# Patient Record
Sex: FEMALE | Race: WHITE | NOT HISPANIC OR LATINO | Employment: UNEMPLOYED | URBAN - METROPOLITAN AREA
[De-identification: names, ages, dates, MRNs, and addresses within clinical notes are randomized per-mention and may not be internally consistent; named-entity substitution may affect disease eponyms.]

---

## 2017-03-15 ENCOUNTER — HOSPITAL ENCOUNTER (EMERGENCY)
Facility: MEDICAL CENTER | Age: 27
End: 2017-03-15
Attending: EMERGENCY MEDICINE
Payer: MEDICAID

## 2017-03-15 VITALS
TEMPERATURE: 98.2 F | BODY MASS INDEX: 27.47 KG/M2 | RESPIRATION RATE: 15 BRPM | HEART RATE: 49 BPM | SYSTOLIC BLOOD PRESSURE: 119 MMHG | OXYGEN SATURATION: 100 % | DIASTOLIC BLOOD PRESSURE: 70 MMHG | WEIGHT: 145.5 LBS | HEIGHT: 61 IN

## 2017-03-15 DIAGNOSIS — R10.9 CHRONIC ABDOMINAL PAIN: ICD-10-CM

## 2017-03-15 DIAGNOSIS — N39.0 ACUTE UTI: ICD-10-CM

## 2017-03-15 DIAGNOSIS — G89.29 CHRONIC ABDOMINAL PAIN: ICD-10-CM

## 2017-03-15 LAB
APPEARANCE UR: CLEAR
APPEARANCE UR: CLEAR
COLOR UR AUTO: YELLOW
COLOR UR AUTO: YELLOW
GLUCOSE UR QL STRIP.AUTO: NEGATIVE MG/DL
GLUCOSE UR QL STRIP.AUTO: NEGATIVE MG/DL
HCG UR QL: NEGATIVE
KETONES UR QL STRIP.AUTO: NEGATIVE MG/DL
KETONES UR QL STRIP.AUTO: NEGATIVE MG/DL
LEUKOCYTE ESTERASE UR QL STRIP.AUTO: NEGATIVE
LEUKOCYTE ESTERASE UR QL STRIP.AUTO: NEGATIVE
NITRITE UR QL STRIP.AUTO: POSITIVE
NITRITE UR QL STRIP.AUTO: POSITIVE
PH UR STRIP.AUTO: 6.5 [PH]
PH UR STRIP.AUTO: 6.5 [PH]
PROT UR QL STRIP: NEGATIVE MG/DL
PROT UR QL STRIP: NEGATIVE MG/DL
RBC UR QL AUTO: ABNORMAL
RBC UR QL AUTO: ABNORMAL
SP GR UR: 1.02
SP GR UR: 1.02

## 2017-03-15 PROCEDURE — 81025 URINE PREGNANCY TEST: CPT

## 2017-03-15 PROCEDURE — 81002 URINALYSIS NONAUTO W/O SCOPE: CPT | Mod: 91

## 2017-03-15 PROCEDURE — 99284 EMERGENCY DEPT VISIT MOD MDM: CPT

## 2017-03-15 RX ORDER — OXYCODONE HYDROCHLORIDE AND ACETAMINOPHEN 5; 325 MG/1; MG/1
1 TABLET ORAL EVERY 6 HOURS PRN
Qty: 15 TAB | Refills: 0 | Status: SHIPPED | OUTPATIENT
Start: 2017-03-15 | End: 2018-07-03

## 2017-03-15 RX ORDER — CIPROFLOXACIN 500 MG/1
500 TABLET, FILM COATED ORAL 2 TIMES DAILY
Qty: 14 TAB | Refills: 0 | Status: SHIPPED | OUTPATIENT
Start: 2017-03-15 | End: 2017-03-22

## 2017-03-15 NOTE — ED AVS SNAPSHOT
AddressHealth Access Code: [email or web address]  Expires: 4/14/2017  6:24 PM    Your email address is not on file at [facility].  Email Addresses are required for you to sign up for AddressHealth, please contact [phone] to verify your personal information and to provide your email address prior to attempting to register for AddressHealth.    Toña Turner  [address] [place] [address]    AddressHealth  A secure, online tool to manage your health information     [facility]’s AddressHealth® is a secure, online tool that connects you to your personalized health information from the privacy of your home -- day or night - making it very easy for you to manage your healthcare. Once the activation process is completed, you can even access your medical information using the AddressHealth regine, which is available for free in the Apple Regine store or Google Play store.     To learn more about AddressHealth, visit [email or web address]    There are two levels of access available (as shown below):   My Chart Features  [facility] Primary Care Doctor [facility]  Specialists [facility]  Urgent  Care [facility] Primary Care Doctor   Email your healthcare team securely and privately 24/7 X X X    Manage appointments: schedule your next appointment; view details of past/upcoming appointments X      Request prescription refills. X      View recent personal medical records, including lab and immunizations X X X X   View health record, including health history, allergies, medications X X X X   Read reports about your outpatient visits, procedures, consult and ER notes X X X X   See your discharge summary, which is a recap of your hospital and/or ER visit that includes your diagnosis, lab results, and care plan X X  X     How to register for [email or web address]:  Once your e-mail address has been verified, follow the following steps to sign up for AddressHealth.     1. Go to  [email or web address]  2. Click on the Sign Up Now box, which takes you to the New Member Sign Up page. You will  need to provide the following information:  a. Enter your M-Audio Access Code exactly as it appears at the top of this page. (You will not need to use this code after you’ve completed the sign-up process. If you do not sign up before the expiration date, you must request a new code.)   b. Enter your date of birth.   c. Enter your home email address.   d. Click Submit, and follow the next screen’s instructions.  3. Create a Companion Caninet ID. This will be your M-Audio login ID and cannot be changed, so think of one that is secure and easy to remember.  4. Create a M-Audio password. You can change your password at any time.  5. Enter your Password Reset Question and Answer. This can be used at a later time if you forget your password.   6. Enter your e-mail address. This allows you to receive e-mail notifications when new information is available in M-Audio.  7. Click Sign Up. You can now view your health information.    For assistance activating your M-Audio account, call [phone]

## 2017-03-15 NOTE — ED PROVIDER NOTES
ED Provider Note    HPI: Patient is a 26-year-old female who presented to the emergency department March 15, 2017 at 2:15 PM with a chief complaint of intermittent vaginal pain.    Patient's been seen multiple emergency departments for this. Symptoms began several months ago. The patient has an IUD in place. The patient states previous testing has been done including ultrasounds and laboratory studies. She has been diagnosed with urinary tract infection but no other findings of an made. The patient had an episode earlier today. The pain is identical to that experienced in the past. She had no vaginal bleeding or discharge. The patient has no point with a gynecologist on Friday. Pain is described as a stabbing pain. It is not positional in nature. No fever no chills no cough no nausea no vomiting. Nothing in particular seems to make it better or worse.    Review of Systems: Positive positive intermittent stabbing vaginal pain negative for fever chills cough nausea vomiting.     Past medical/surgical history: Chronic vaginal pain for about history of seizure    Medications: None    Allergies: Penicillin and Vicodin    Social History: Previous history smoking or alcohol use      Physical exam: Constitutional: Pleasant female awake and alert  Vital signs: Temperature 90.2 pulse 65 respirations 18 blood pressure 114/66 pulse oximetry 100%  Cardiac: Regular rate and rhythm. S1-S2 present. No S3 or S4 present. No murmurs, rubs, or gallops heard. No edema or varicosities were seen.   Lungs: Clear to auscultation with good aeration throughout. No wheezes, rales, or rhonchi heard. Patient's chest wall moved symmetrically with each respiratory effort. Patient was not making use of accessory muscles of respiration in breathing.  Abdomen: Soft nontender to palpation. Slightly obese but not markedly so. No rebound or guarding elicited. No organomegaly identified. No pulsatile abdominal masses identified.   Neurologic: alert and  awake answers questions appropriately. Moves all four extremities independently, no gross focal abnormalities identified. Normal strength and motor.  Skin: no rash or lesion seen, no palpable dermatologic lesions identified.  Psychiatric: not anxious, delusional, or hallucinating.    Medical decision making:  Urine dip obtained; pregnancy test negative positive for nitrites    I reviewed the patient's drug database report there is no record of the patient and the drug database.  the patient has previously received prescription for controlled substances from this facility so I do not know why she does not appear in the database. She does not appear to have a record of significant disuse of the emergency department however. I did ask the patient about this, and she states she is not signed in under any other names or addresses.    Given the patient's problems are chronic and she has appropriate follow-up scheduled in 48 hours, I did not think emergent imaging or other laboratory evaluation was indicated.    Patient given copies of her lab results. Patient discharged on small amount of pain medication and ciprofloxacin. Patient given the usual discharge instructions for abdominal pain. The patient appears to have a chronic problem at this time. She has follow-up scheduled in 48 hours. The patient is carefully counseled return to ED for increasing pain fever vomiting or any other problems    Patient verbalized understanding of these instructions and states she will comply    Impression 1) chronic abdominal pain  2) UTI

## 2017-03-15 NOTE — ED NOTES
C/O VAGINAL PAIN THAT RADIATES TO BELLY BUTTON, HAS BEEN SEEN AT NO [place], [facility] AND HERE 8X IN 2 MONTHS W NO DX , HAS APPT W [facility] ON Friday , DENIES DISCHARGE, FEVERS, SLIGHT PAIN W URINATION, GIVEN UA SUPPLIES

## 2017-03-15 NOTE — ED AVS SNAPSHOT
3/15/2017          Toña Turner  [address] [facility] [address]    Dear Toña:    [facility] wants to ensure your discharge home is safe and you or your loved ones have had all your questions answered regarding your care after you leave the hospital.    You may receive a telephone call within two days of your discharge.  This call is to make certain you understand your discharge instructions as well as ensure we provided you with the best care possible during your stay with us.     The call will only last approximately 3-5 minutes and will be done by a nurse.    Once again, we want to ensure your discharge home is safe and that you have a clear understanding of any next steps in your care.  If you have any questions or concerns, please do not hesitate to contact us, we are here for you.  Thank you for choosing [facility] for your healthcare needs.    Sincerely,    Glenn Odom    [facility]

## 2017-03-15 NOTE — ED AVS SNAPSHOT
Home Care Instructions                                                                                                                Toña Turner   MRN: [id number]    Department:  [facility], Emergency Dept   Date of Visit:  3/15/2017            [facility], Emergency Dept    [address] [place] [id number]    Phone:  [phone]      You were seen by     Reji Ignacio M.D.      Your Diagnosis Was     Chronic abdominal pain     R10.9, G89.29       Follow-up Information     1. Follow up with [facility], Emergency Dept.    Specialty:  Emergency Medicine    Why:  As needed    Contact information    [address] [place] [id number] [phone]      Medication Information     Review all of your home medications and newly ordered medications with your primary doctor and/or pharmacist as soon as possible. Follow medication instructions as directed by your doctor and/or pharmacist.     Please keep your complete medication list with you and share with your physician. Update the information when medications are discontinued, doses are changed, or new medications (including over-the-counter products) are added; and carry medication information at all times in the event of emergency situations.               Medication List      START taking these medications        Instructions    Morning Afternoon Evening Bedtime    ciprofloxacin 500 MG Tabs   Commonly known as:  CIPRO        Take 1 Tab by mouth 2 times a day for 7 days.   Dose:  500 mg                          ASK your doctor about these medications        Instructions    Morning Afternoon Evening Bedtime    cyclobenzaprine 10 MG Tabs   Commonly known as:  FLEXERIL        Take 1 Tab by mouth 3 times a day as needed.   Dose:  10 mg                        * ibuprofen 600 MG Tabs   Commonly known as:  MOTRIN        Take 1 Tab by mouth every 6 hours as needed.   Dose:  600 mg                        * ibuprofen 200 MG Tabs   Commonly known as:  MOTRIN        Take 400 mg by mouth every 6 hours as needed.   Dose:  400 mg                        * oxycodone-acetaminophen 5-325 MG Tabs   What changed:  Another medication with the same name was added. Make sure you understand how and when to take each.   Commonly known as:  PERCOCET   Ask about: Which instructions should I use?        Take 1-2 Tabs by mouth every four hours as needed (pain).   Dose:  1-2 Tab                        * oxycodone-acetaminophen 5-325 MG Tabs   What changed:  Another medication with the same name was added. Make sure you understand how and when to take each.   Commonly known as:  PERCOCET   Ask about: Which instructions should I use?        Take 1-2 Tabs by mouth every four hours as needed.   Dose:  1-2 Tab                        * oxycodone-acetaminophen 5-325 MG Tabs   What changed:  You were already taking a medication with the same name, and this prescription was added. Make sure you understand how and when to take each.   Commonly known as:  PERCOCET   Ask about: Which instructions should I use?        Take 1 Tab by mouth every 6 hours as needed.   Dose:  1 Tab                        * Notice:  This list has 5 medication(s) that are the same as other medications prescribed for you. Read the directions carefully, and ask your doctor or other care provider to review them with you.         Where to Get Your Medications      You can get these medications from any pharmacy     Bring a paper prescription for each of these medications    - ciprofloxacin 500 MG Tabs  - oxycodone-acetaminophen 5-325 MG Tabs            Procedures and tests performed during your visit     Procedure/Test Number of Times Performed    POC UA 2    POC URINE PREGNANCY 1        Discharge Instructions       Abdominal Pain (Nonspecific)  Your exam might not show the exact reason you have abdominal pain. Since there are many different causes of abdominal pain, another  checkup and more tests may be needed. It is very important to follow up for lasting (persistent) or worsening symptoms. A possible cause of abdominal pain in any person who still has his or her appendix is acute appendicitis. Appendicitis is often hard to diagnose. Normal blood tests, urine tests, ultrasound, and CT scans do not completely rule out early appendicitis or other causes of abdominal pain. Sometimes, only the changes that happen over time will allow appendicitis and other causes of abdominal pain to be determined. Other potential problems that may require surgery may also take time to become more apparent. Because of this, it is important that you follow all of the instructions below.  HOME CARE INSTRUCTIONS   · Rest as much as possible.   · Do not eat solid food until your pain is gone.   · While adults or children have pain: A diet of water, weak decaffeinated tea, broth or bouillon, gelatin, oral rehydration solutions (ORS), frozen ice pops, or ice chips may be helpful.   · When pain is gone in adults or children: Start a light diet (dry toast, crackers, applesauce, or white rice). Increase the diet slowly as long as it does not bother you. Eat no dairy products (including cheese and eggs) and no spicy, fatty, fried, or high-fiber foods.   · Use no alcohol, caffeine, or cigarettes.   · Take your regular medicines unless your caregiver told you not to.   · Take any prescribed medicine as directed.   · Only take over-the-counter or prescription medicines for pain, discomfort, or fever as directed by your caregiver. Do not give aspirin to children.   If your caregiver has given you a follow-up appointment, it is very important to keep that appointment. Not keeping the appointment could result in a permanent injury and/or lasting (chronic) pain and/or disability. If there is any problem keeping the appointment, you must call to reschedule.   SEEK IMMEDIATE MEDICAL CARE IF:   · Your pain is not gone in 24  hours.   · Your pain becomes worse, changes location, or feels different.   · You or your child has an oral temperature above 102° F (38.9° C), not controlled by medicine.   · Your baby is older than 3 months with a rectal temperature of 102° F (38.9° C) or higher.   · Your baby is 3 months old or younger with a rectal temperature of 100.4° F (38° C) or higher.   · You have shaking chills.   · You keep throwing up (vomiting) or cannot drink liquids.   · There is blood in your vomit or you see blood in your bowel movements.   · Your bowel movements become dark or black.   · You have frequent bowel movements.   · Your bowel movements stop (become blocked) or you cannot pass gas.   · You have bloody, frequent, or painful urination.   · You have yellow discoloration in the skin or whites of the eyes.   · Your stomach becomes bloated or bigger.   · You have dizziness or fainting.   · You have chest or back pain.   MAKE SURE YOU:   · Understand these instructions.   · Will watch your condition.   · Will get help right away if you are not doing well or get worse.   Document Released: 12/18/2006 Document Revised: 03/11/2013 Document Reviewed: 11/15/2010  ExitCare® Patient Information ©2013 [employer].          Patient Information     Patient Information    Following emergency treatment: all patient requiring follow-up care must return either to a private physician or a clinic if your condition worsens before you are able to obtain further medical attention, please return to the emergency room.     Billing Information    At [facility], we work to make the billing process streamlined for our patients.  Our Representatives are here to answer any questions you may have regarding your hospital bill.  If you have insurance coverage and have supplied your insurance information to us, we will submit a claim to your insurer on your behalf.  Should you have any questions regarding your bill, we can be reached online or by phone  as follows:  Online: You are able pay your bills online or live chat with our representatives about any billing questions you may have. We are here to help Monday - Friday from 8:00am to 7:30pm and 9:00am - 12:00pm on Saturdays.  Please visit [email or web address]  for more information.   Phone:  [phone] or [phone]    Please note that your emergency physician, surgeon, pathologist, radiologist, anesthesiologist, and other specialists are not employed by [facility] and will therefore bill separately for their services.  Please contact them directly for any questions concerning their bills at the numbers below:     Emergency Physician Services:  [phone]  [facility]:  [phone]  [facility]:  [phone]  [facility]:  [phone]    1. Your final bill may vary from the amount quoted upon discharge if all procedures are not complete at that time, or if your doctor has additional procedures of which we are not aware. You will receive an additional bill if you return to the Emergency Department at [facility] for suture removal regardless of the facility of which the sutures were placed.     2. Please arrange for settlement of this account at the emergency registration.    3. All self-pay accounts are due in full at the time of treatment.  If you are unable to meet this obligation then payment is expected within 4-5 days.     4. If you have had radiology studies (CT, X-ray, Ultrasound, MRI), you have received a preliminary result during your emergency department visit. Please contact the radiology department [phone] to receive a copy of your final result. Please discuss the Final result with your primary physician or with the follow up physician provided.     Crisis Hotline:  [place] Crisis Hotline:  9-094-IQCOTQB or 1-611.782.2928  [place] Crisis Hotline:    1-296.264.2849 or 465-474-5989         ED Discharge  Follow Up Questions    1. In order to provide you with very good care, we would like to follow up with a phone call in the next few days.  May we have your permission to contact you?     YES /  NO    2. What is the best phone number to call you? (       )_____-__________    3. What is the best time to call you?      Morning  /  Afternoon  /  Evening                   Patient Signature:  ____________________________________________________________    Date:  ____________________________________________________________

## 2017-03-16 NOTE — DISCHARGE INSTRUCTIONS
Abdominal Pain (Nonspecific)  Your exam might not show the exact reason you have abdominal pain. Since there are many different causes of abdominal pain, another checkup and more tests may be needed. It is very important to follow up for lasting (persistent) or worsening symptoms. A possible cause of abdominal pain in any person who still has his or her appendix is acute appendicitis. Appendicitis is often hard to diagnose. Normal blood tests, urine tests, ultrasound, and CT scans do not completely rule out early appendicitis or other causes of abdominal pain. Sometimes, only the changes that happen over time will allow appendicitis and other causes of abdominal pain to be determined. Other potential problems that may require surgery may also take time to become more apparent. Because of this, it is important that you follow all of the instructions below.  HOME CARE INSTRUCTIONS   · Rest as much as possible.   · Do not eat solid food until your pain is gone.   · While adults or children have pain: A diet of water, weak decaffeinated tea, broth or bouillon, gelatin, oral rehydration solutions (ORS), frozen ice pops, or ice chips may be helpful.   · When pain is gone in adults or children: Start a light diet (dry toast, crackers, applesauce, or white rice). Increase the diet slowly as long as it does not bother you. Eat no dairy products (including cheese and eggs) and no spicy, fatty, fried, or high-fiber foods.   · Use no alcohol, caffeine, or cigarettes.   · Take your regular medicines unless your caregiver told you not to.   · Take any prescribed medicine as directed.   · Only take over-the-counter or prescription medicines for pain, discomfort, or fever as directed by your caregiver. Do not give aspirin to children.   If your caregiver has given you a follow-up appointment, it is very important to keep that appointment. Not keeping the appointment could result in a permanent injury and/or lasting (chronic) pain  and/or disability. If there is any problem keeping the appointment, you must call to reschedule.   SEEK IMMEDIATE MEDICAL CARE IF:   · Your pain is not gone in 24 hours.   · Your pain becomes worse, changes location, or feels different.   · You or your child has an oral temperature above 102° F (38.9° C), not controlled by medicine.   · Your baby is older than 3 months with a rectal temperature of 102° F (38.9° C) or higher.   · Your baby is 3 months old or younger with a rectal temperature of 100.4° F (38° C) or higher.   · You have shaking chills.   · You keep throwing up (vomiting) or cannot drink liquids.   · There is blood in your vomit or you see blood in your bowel movements.   · Your bowel movements become dark or black.   · You have frequent bowel movements.   · Your bowel movements stop (become blocked) or you cannot pass gas.   · You have bloody, frequent, or painful urination.   · You have yellow discoloration in the skin or whites of the eyes.   · Your stomach becomes bloated or bigger.   · You have dizziness or fainting.   · You have chest or back pain.   MAKE SURE YOU:   · Understand these instructions.   · Will watch your condition.   · Will get help right away if you are not doing well or get worse.   Document Released: 12/18/2006 Document Revised: 03/11/2013 Document Reviewed: 11/15/2010  ExitCare® Patient Information ©2013 [employer].

## 2017-03-16 NOTE — ED NOTES
All lines and monitors d/c'd. Discharge paperwork and medications reviewed. Pt states she understands and has no questions. Pt encouraged to follow-up with PCP and come back to ER with worsening symptoms. Instructed not to drive after taking pain medication. Pt verbalizes understanding. Pt ambulated out of ED with steady gait.

## 2017-07-22 ENCOUNTER — HOSPITAL ENCOUNTER (EMERGENCY)
Facility: MEDICAL CENTER | Age: 27
End: 2017-07-22
Attending: EMERGENCY MEDICINE
Payer: MEDICAID

## 2017-07-22 VITALS
OXYGEN SATURATION: 97 % | RESPIRATION RATE: 14 BRPM | DIASTOLIC BLOOD PRESSURE: 72 MMHG | SYSTOLIC BLOOD PRESSURE: 136 MMHG | TEMPERATURE: 98.2 F | BODY MASS INDEX: 26.63 KG/M2 | HEART RATE: 76 BPM | WEIGHT: 140.87 LBS

## 2017-07-22 DIAGNOSIS — Z20.2 STD EXPOSURE: ICD-10-CM

## 2017-07-22 PROCEDURE — 99283 EMERGENCY DEPT VISIT LOW MDM: CPT

## 2017-07-22 NOTE — DISCHARGE INSTRUCTIONS
Sexually Transmitted Disease  A sexually transmitted disease (STD) is a disease or infection often passed to another person during sex. However, STDs can be passed through nonsexual ways. An STD can be passed through:  · Spit (saliva).  · Semen.  · Blood.  · Mucus from the vagina.  · Pee (urine).  HOW CAN I LESSEN MY CHANCES OF GETTING AN STD?  · Use:  ¨ Latex condoms.  ¨ Water-soluble lubricants with condoms. Do not use petroleum jelly or oils.  ¨ Dental dams. These are small pieces of latex that are used as a barrier during oral sex.  · Avoid having more than one sex partner.  · Do not have sex with someone who has other sex partners.  · Do not have sex with anyone you do not know or who is at high risk for an STD.  · Avoid risky sex that can break your skin.  · Do not have sex if you have open sores on your mouth or skin.  · Avoid drinking too much alcohol or taking illegal drugs. Alcohol and drugs can affect your good judgment.  · Avoid oral and anal sex acts.  · Get shots (vaccines) for HPV and hepatitis.  · If you are at risk of being infected with HIV, it is advised that you take a certain medicine daily to prevent HIV infection. This is called pre-exposure prophylaxis (PrEP). You may be at risk if:  ¨ You are a man who has sex with other men (MSM).  ¨ You are attracted to the opposite sex (heterosexual) and are having sex with more than one partner.  ¨ You take drugs with a needle.  ¨ You have sex with someone who has HIV.  · Talk with your doctor about if you are at high risk of being infected with HIV. If you begin to take PrEP, get tested for HIV first. Get tested every 3 months for as long as you are taking PrEP.  · Get tested for STDs every year if you are sexually active. If you are treated for an STD, get tested again 3 months after you are treated.  WHAT SHOULD I DO IF I THINK I HAVE AN STD?  · See your doctor.  · Tell your sex partner(s) that you have an STD. They should be tested and treated.  · Do  not have sex until your doctor says it is okay.  WHEN SHOULD I GET HELP?  Get help right away if:  · You have bad belly (abdominal) pain.  · You are a man and have puffiness (swelling) or pain in your testicles.  · You are a woman and have puffiness in your vagina.     This information is not intended to replace advice given to you by your health care provider. Make sure you discuss any questions you have with your health care provider.     Document Released: 01/25/2006 Document Revised: 01/08/2016 Document Reviewed: 06/13/2014  [employer] Interactive Patient Education ©2016 [employer]

## 2017-07-22 NOTE — ED AVS SNAPSHOT
Kewego Access Code: [email or web address]  Expires: 8/21/2017  1:41 AM    Your email address is not on file at [facility].  Email Addresses are required for you to sign up for Kewego, please contact [phone] to verify your personal information and to provide your email address prior to attempting to register for Kewego.    Toña Turner  [address] [facility] [place] [address]    Kewego  A secure, online tool to manage your health information     [facility]’s Kewego® is a secure, online tool that connects you to your personalized health information from the privacy of your home -- day or night - making it very easy for you to manage your healthcare. Once the activation process is completed, you can even access your medical information using the Kewego regine, which is available for free in the Apple Regine store or Google Play store.     To learn more about Kewego, visit [email or web address] [email or web address]    There are two levels of access available (as shown below):   My Chart Features  [facility] Primary Care Doctor [facility]  Specialists [facility]  Urgent  Care Non-[facility] Primary Care Doctor   Email your healthcare team securely and privately 24/7 X X X    Manage appointments: schedule your next appointment; view details of past/upcoming appointments X      Request prescription refills. X      View recent personal medical records, including lab and immunizations X X X X   View health record, including health history, allergies, medications X X X X   Read reports about your outpatient visits, procedures, consult and ER notes X X X X   See your discharge summary, which is a recap of your hospital and/or ER visit that includes your diagnosis, lab results, and care plan X X  X     How to register for [employer]:  Once your e-mail address has been verified, follow the following steps to sign up for [employer].     1. Go to  [email or web address]  2. Click on the Sign Up Now box, which takes you to the New Member Sign Up page. You will  need to provide the following information:  a. Enter your [employer] Access Code exactly as it appears at the top of this page. (You will not need to use this code after you’ve completed the sign-up process. If you do not sign up before the expiration date, you must request a new code.)   b. Enter your date of birth.   c. Enter your home email address.   d. Click Submit, and follow the next screen’s instructions.  3. Create a Intuitt ID. This will be your [employer] login ID and cannot be changed, so think of one that is secure and easy to remember.  4. Create a [employer] password. You can change your password at any time.  5. Enter your Password Reset Question and Answer. This can be used at a later time if you forget your password.   6. Enter your e-mail address. This allows you to receive e-mail notifications when new information is available in [employer].  7. Click Sign Up. You can now view your health information.    For assistance activating your [employer] account, call [phone]

## 2017-07-22 NOTE — ED AVS SNAPSHOT
Home Care Instructions                                                                                                                Toña Turner   MRN: [id number]    Department:  [facility], Emergency Dept   Date of Visit:  7/22/2017            [facility], Emergency Dept    [address] [address]    Phone:  [phone]      You were seen by     Kennedy Duncan M.D.      Your Diagnosis Was     STD exposure     Z20.2       Follow-up Information     1. Schedule an appointment as soon as possible for a visit with [facility].    Contact information    [address] [address] [phone]        2. Follow up with [facility], Emergency Dept.    Specialty:  Emergency Medicine    Why:  If symptoms worsen    Contact information    [address] [address] [phone]      Medication Information     Review all of your home medications and newly ordered medications with your primary doctor and/or pharmacist as soon as possible. Follow medication instructions as directed by your doctor and/or pharmacist.     Please keep your complete medication list with you and share with your physician. Update the information when medications are discontinued, doses are changed, or new medications (including over-the-counter products) are added; and carry medication information at all times in the event of emergency situations.               Medication List      ASK your doctor about these medications        Instructions    Morning Afternoon Evening Bedtime    cyclobenzaprine 10 MG Tabs   Commonly known as:  FLEXERIL        Take 1 Tab by mouth 3 times a day as needed.   Dose:  10 mg                        * ibuprofen 600 MG Tabs   Commonly known as:  MOTRIN        Take 1 Tab by mouth every 6 hours as needed.   Dose:  600 mg                        * ibuprofen 200 MG Tabs   Commonly known as:  MOTRIN        Take 400 mg  by mouth every 6 hours as needed.   Dose:  400 mg                        * oxycodone-acetaminophen 5-325 MG Tabs   Commonly known as:  PERCOCET        Take 1-2 Tabs by mouth every four hours as needed (pain).   Dose:  1-2 Tab                        * oxycodone-acetaminophen 5-325 MG Tabs   Commonly known as:  PERCOCET        Take 1-2 Tabs by mouth every four hours as needed.   Dose:  1-2 Tab                        * oxycodone-acetaminophen 5-325 MG Tabs   Commonly known as:  PERCOCET        Take 1 Tab by mouth every 6 hours as needed.   Dose:  1 Tab                        * Notice:  This list has 5 medication(s) that are the same as other medications prescribed for you. Read the directions carefully, and ask your doctor or other care provider to review them with you.              Discharge Instructions       Sexually Transmitted Disease  A sexually transmitted disease (STD) is a disease or infection often passed to another person during sex. However, STDs can be passed through nonsexual ways. An STD can be passed through:  · Spit (saliva).  · Semen.  · Blood.  · Mucus from the vagina.  · Pee (urine).  HOW CAN I LESSEN MY CHANCES OF GETTING AN STD?  · Use:  ¨ Latex condoms.  ¨ Water-soluble lubricants with condoms. Do not use petroleum jelly or oils.  ¨ Dental dams. These are small pieces of latex that are used as a barrier during oral sex.  · Avoid having more than one sex partner.  · Do not have sex with someone who has other sex partners.  · Do not have sex with anyone you do not know or who is at high risk for an STD.  · Avoid risky sex that can break your skin.  · Do not have sex if you have open sores on your mouth or skin.  · Avoid drinking too much alcohol or taking illegal drugs. Alcohol and drugs can affect your good judgment.  · Avoid oral and anal sex acts.  · Get shots (vaccines) for HPV and hepatitis.  · If you are at risk of being infected with HIV, it is advised that you take a certain medicine daily  to prevent HIV infection. This is called pre-exposure prophylaxis (PrEP). You may be at risk if:  ¨ You are a man who has sex with other men (MSM).  ¨ You are attracted to the opposite sex (heterosexual) and are having sex with more than one partner.  ¨ You take drugs with a needle.  ¨ You have sex with someone who has HIV.  · Talk with your doctor about if you are at high risk of being infected with HIV. If you begin to take PrEP, get tested for HIV first. Get tested every 3 months for as long as you are taking PrEP.  · Get tested for STDs every year if you are sexually active. If you are treated for an STD, get tested again 3 months after you are treated.  WHAT SHOULD I DO IF I THINK I HAVE AN STD?  · See your doctor.  · Tell your sex partner(s) that you have an STD. They should be tested and treated.  · Do not have sex until your doctor says it is okay.  WHEN SHOULD I GET HELP?  Get help right away if:  · You have bad belly (abdominal) pain.  · You are a man and have puffiness (swelling) or pain in your testicles.  · You are a woman and have puffiness in your vagina.     This information is not intended to replace advice given to you by your health care provider. Make sure you discuss any questions you have with your health care provider.     Document Released: 01/25/2006 Document Revised: 01/08/2016 Document Reviewed: 06/13/2014  [employer] Interactive Patient Education ©2016 [employer]            Patient Information     Patient Information    Following emergency treatment: all patient requiring follow-up care must return either to a private physician or a clinic if your condition worsens before you are able to obtain further medical attention, please return to the emergency room.     Billing Information    At [facility], we work to make the billing process streamlined for our patients.  Our Representatives are here to answer any questions you may have regarding your hospital bill.  If you have insurance  coverage and have supplied your insurance information to us, we will submit a claim to your insurer on your behalf.  Should you have any questions regarding your bill, we can be reached online or by phone as follows:  Online: You are able pay your bills online or live chat with our representatives about any billing questions you may have. We are here to help Monday - Friday from 8:00am to 7:30pm and 9:00am - 12:00pm on Saturdays.  Please visit [email or web address]  for more information.   Phone:  [phone] or [phone]    Please note that your emergency physician, surgeon, pathologist, radiologist, anesthesiologist, and other specialists are not employed by [facility] and will therefore bill separately for their services.  Please contact them directly for any questions concerning their bills at the numbers below:     Emergency Physician Services:  [phone]  [facility]:  [phone]  [facility]:  [phone]  [facility]:  [phone]    1. Your final bill may vary from the amount quoted upon discharge if all procedures are not complete at that time, or if your doctor has additional procedures of which we are not aware. You will receive an additional bill if you return to the Emergency Department at [facility] for suture removal regardless of the facility of which the sutures were placed.     2. Please arrange for settlement of this account at the emergency registration.    3. All self-pay accounts are due in full at the time of treatment.  If you are unable to meet this obligation then payment is expected within 4-5 days.     4. If you have had radiology studies (CT, X-ray, Ultrasound, MRI), you have received a preliminary result during your emergency department visit. Please contact the radiology department [phone] to receive a copy of your final result. Please discuss the Final result with your primary  physician or with the follow up physician provided.     Crisis Hotline:  [place] Crisis Hotline:  4-753-XDQVFLP or 1-984.890.8347  [place] Crisis Hotline:    1-240.661.7222 or 300-992-8510         ED Discharge Follow Up Questions    1. In order to provide you with very good care, we would like to follow up with a phone call in the next few days.  May we have your permission to contact you?     YES /  NO    2. What is the best phone number to call you? (       )_____-__________    3. What is the best time to call you?      Morning  /  Afternoon  /  Evening                   Patient Signature:  ____________________________________________________________    Date:  ____________________________________________________________

## 2017-07-22 NOTE — ED AVS SNAPSHOT
7/22/2017    Toña Turner  [address] [facility] [address]    Dear Toña:    [facility] wants to ensure your discharge home is safe and you or your loved ones have had all of your questions answered regarding your care after you leave the hospital.    Below is a list of resources and contact information should you have any questions regarding your hospital stay, follow-up instructions, or active medical symptoms.    Questions or Concerns Regarding… Contact   Medical Questions Related to Your Discharge  (7 days a week, 8am-5pm) Contact a Nurse Care Coordinator   [phone]   Medical Questions Not Related to Your Discharge  (24 hours a day / 7 days a week)  Contact the Nurse Health Line   [phone]    Medications or Discharge Instructions Refer to your discharge packet   or contact your [facility] Primary Care Provider   [phone]   Follow-up Appointment(s) Schedule your appointment via [employer]   or contact Scheduling [phone]   Billing Review your statement via [employer]  or contact Billing [phone]   Medical Records Review your records via [employer]   or contact Medical Records [phone]     You may receive a telephone call within two days of discharge. This call is to make certain you understand your discharge instructions and have the opportunity to have any questions answered. You can also easily access your medical information, test results and upcoming appointments via the [employer] free online health management tool. You can learn more and sign up at [email or web address]. For assistance setting up your [employer] account, please call [phone].    Once again, we want to ensure your discharge home is safe and that you have a clear understanding of any next steps in your care. If you have any questions or concerns, please do not hesitate to contact us, we are here for you. Thank you for choosing [facility] for your healthcare needs.    Sincerely,    Your [facility] Healthcare Team

## 2017-07-22 NOTE — PROGRESS NOTES
Discharge orders received from ERP. No new prescriptions received. Provided education and patient demonstrated understanding. Pt ambulatory off unit. All personal belonging with patient.

## 2017-07-22 NOTE — ED NOTES
Chief Complaint   Patient presents with   • Exposure to STD     pt with ex boyfriend having genital herpes and wants to be tested for this denies any blisters to genital area     .Pt Informed regarding triage process and verbalized understanding to inform triage tech or RN for any changes in condition.  Placed in lobby.

## 2018-07-03 ENCOUNTER — HOSPITAL ENCOUNTER (EMERGENCY)
Facility: MEDICAL CENTER | Age: 28
End: 2018-07-03
Attending: EMERGENCY MEDICINE
Payer: MEDICAID

## 2018-07-03 ENCOUNTER — APPOINTMENT (OUTPATIENT)
Dept: RADIOLOGY | Facility: MEDICAL CENTER | Age: 28
End: 2018-07-03
Attending: EMERGENCY MEDICINE
Payer: MEDICAID

## 2018-07-03 VITALS
RESPIRATION RATE: 16 BRPM | HEART RATE: 60 BPM | OXYGEN SATURATION: 98 % | SYSTOLIC BLOOD PRESSURE: 108 MMHG | HEIGHT: 62 IN | DIASTOLIC BLOOD PRESSURE: 72 MMHG | BODY MASS INDEX: 25.88 KG/M2 | TEMPERATURE: 98 F | WEIGHT: 140.65 LBS

## 2018-07-03 DIAGNOSIS — S92.425A CLOSED NONDISPLACED FRACTURE OF DISTAL PHALANX OF LEFT GREAT TOE, INITIAL ENCOUNTER: ICD-10-CM

## 2018-07-03 PROCEDURE — 73660 X-RAY EXAM OF TOE(S): CPT | Mod: LT

## 2018-07-03 PROCEDURE — 99284 EMERGENCY DEPT VISIT MOD MDM: CPT

## 2018-07-03 ASSESSMENT — PAIN SCALES - GENERAL: PAINLEVEL_OUTOF10: 10

## 2018-07-04 NOTE — ED PROVIDER NOTES
"ED Provider Note    CHIEF COMPLAINT   Chief Complaint   Patient presents with   • Foot Pain     Pt states she dropped a bed frame on her left big toe.  Pt has +CMS in toe.  Toe is bruised and painful to touch.         HPI   Toña Turner is a 28 y.o. female who presents for moderately severe left great toe pain and bruising with swelling.  She dropped a piece of a bed she was disassembling onto the toe yesterday.  She can walk with difficulty.    REVIEW OF SYSTEMS   Pertinent positives: Left toe pain swelling and bruising.  Pertinent negatives: Deformity.    PAST MEDICAL HISTORY   Past Medical History:   Diagnosis Date   • Pregnancy    • Seizure (HCC)        PAST SURGICAL HISTORY  Past Surgical History:   Procedure Laterality Date   • OTHER      tonselectomy   • OTHER ABDOMINAL SURGERY      appendectomy       CURRENT MEDICATIONS   Home Medications     Reviewed by Palmira Carrera R.N. (Registered Nurse) on 07/03/18 at 1958  Med List Status: Complete   Medication Last Dose Status        Patient Kuldeep Taking any Medications                       ALLERGIES   Allergies   Allergen Reactions   • Pcn [Penicillins] Rash   • Vicodin [Hydrocodone-Acetaminophen] Rash       PHYSICAL EXAM  VITAL SIGNS: /77   Pulse 76   Temp 37.1 °C (98.7 °F) (Temporal)   Resp 18   Ht 1.575 m (5' 2\")   Wt 63.8 kg (140 lb 10.5 oz)   SpO2 98%   BMI 25.73 kg/m²   Constitutional: Well developed, Well nourished, borderline blood pressure elevation.  HENT: Atraumatic.   Cardiovascular:  Peripheral pulses dorsalis pedis 1+.  Skin: Ecchymotic great toe.   Musculoskeletal: Diffuse tenderness of left great toe with decreased range of motion due to pain but no deformity  Compartments: Soft foot  Neurologic: Toe flexion extension and sensation intact but movement limited by pain    RADIOLOGY/PROCEDURES  DX-TOE(S) 2+ LEFT   Final Result      Mildly comminuted first terminal tuft fracture          COURSE & MEDICAL DECISION MAKING  This patient " presents with a tuft fracture after blunt trauma to her great toe.  She will be treated with a postop shoe ibuprofen Tylenol and ice    PLAN:  As above    CONDITION: good    FINAL IMPRESSION  1. Closed nondisplaced fracture of distal phalanx of left great toe, initial encounter            Electronically signed by: Douglas Matos, 7/3/2018 8:08 PM

## 2018-07-04 NOTE — ED NOTES
Pt to lobby via wheelchair   Vital signs stable.   Pt states understanding of discharge instructions and paperwork.   Pt states they will follow up with PCP.   Pt handed prescriptions.   Pt states they have a safe way home.

## 2018-07-04 NOTE — ED TRIAGE NOTES
"Chief Complaint   Patient presents with   • Foot Pain     Pt states she dropped a bed frame on her left big toe.  Pt has +CMS in toe.  Toe is bruised and painful to touch.       Pt ambulatory with some difficulty to triage with above complaint.  Pt states she dropped the bed on her foot 24 hours ago.      Pt returned to [facility], educated on triage process, and to inform staff of any changes or concerns.    Blood pressure 137/77, pulse 76, temperature 37.1 °C (98.7 °F), temperature source Temporal, resp. rate 18, height 1.575 m (5' 2\"), weight 63.8 kg (140 lb 10.5 oz), SpO2 98 %.      "

## 2018-07-04 NOTE — ED NOTES
Pt wheeled to [facility], here for left big toe pain after a bedframe fell yesterday. Big toe bruised, tender to touch. Up for eval

## 2018-07-04 NOTE — DISCHARGE INSTRUCTIONS
Ice the toe for 15 minutes at a time for the next 24 hours with a barrier cloth between the ice in your skin.  Take ibuprofen and Tylenol for pain.  Wear postop shoe or other stiff shoes while helpful.  Follow-up for repeat x-rays if not improving in 1 week.

## 2019-10-27 ENCOUNTER — APPOINTMENT (OUTPATIENT)
Dept: RADIOLOGY | Facility: MEDICAL CENTER | Age: 29
End: 2019-10-27
Attending: PHYSICIAN ASSISTANT
Payer: MEDICAID

## 2019-10-27 ENCOUNTER — HOSPITAL ENCOUNTER (INPATIENT)
Facility: MEDICAL CENTER | Age: 29
LOS: 2 days | End: 2019-10-29
Attending: OBSTETRICS & GYNECOLOGY | Admitting: OBSTETRICS & GYNECOLOGY
Payer: MEDICAID

## 2019-10-27 DIAGNOSIS — R52 POSTPARTUM PAIN: ICD-10-CM

## 2019-10-27 LAB
ABO GROUP BLD: NORMAL
ALBUMIN SERPL BCP-MCNC: 3.3 G/DL (ref 3.2–4.9)
ALBUMIN/GLOB SERPL: 1.1 G/DL
ALP SERPL-CCNC: 120 U/L (ref 30–99)
ALT SERPL-CCNC: 9 U/L (ref 2–50)
AMPHET UR QL SCN: NEGATIVE
ANION GAP SERPL CALC-SCNC: 9 MMOL/L (ref 0–11.9)
AST SERPL-CCNC: 14 U/L (ref 12–45)
BARBITURATES UR QL SCN: NEGATIVE
BASOPHILS # BLD AUTO: 0.3 % (ref 0–1.8)
BASOPHILS # BLD: 0.02 K/UL (ref 0–0.12)
BENZODIAZ UR QL SCN: NEGATIVE
BILIRUB SERPL-MCNC: 0.2 MG/DL (ref 0.1–1.5)
BLD GP AB SCN SERPL QL: NORMAL
BUN SERPL-MCNC: 6 MG/DL (ref 8–22)
BZE UR QL SCN: NEGATIVE
CALCIUM SERPL-MCNC: 9.2 MG/DL (ref 8.5–10.5)
CANNABINOIDS UR QL SCN: POSITIVE
CHLORIDE SERPL-SCNC: 106 MMOL/L (ref 96–112)
CO2 SERPL-SCNC: 21 MMOL/L (ref 20–33)
CREAT SERPL-MCNC: 0.59 MG/DL (ref 0.5–1.4)
EOSINOPHIL # BLD AUTO: 0.1 K/UL (ref 0–0.51)
EOSINOPHIL NFR BLD: 1.3 % (ref 0–6.9)
ERYTHROCYTE [DISTWIDTH] IN BLOOD BY AUTOMATED COUNT: 44.9 FL (ref 35.9–50)
GLOBULIN SER CALC-MCNC: 3 G/DL (ref 1.9–3.5)
GLUCOSE SERPL-MCNC: 68 MG/DL (ref 65–99)
GP B STREP DNA SPEC QL NAA+PROBE: NEGATIVE
HBV SURFACE AG SER QL: NEGATIVE
HCT VFR BLD AUTO: 37 % (ref 37–47)
HCV AB SER QL: NEGATIVE
HGB BLD-MCNC: 11.7 G/DL (ref 12–16)
HIV 1+2 AB+HIV1 P24 AG SERPL QL IA: NON REACTIVE
HOLDING TUBE BB 8507: NORMAL
IMM GRANULOCYTES # BLD AUTO: 0.06 K/UL (ref 0–0.11)
IMM GRANULOCYTES NFR BLD AUTO: 0.8 % (ref 0–0.9)
LYMPHOCYTES # BLD AUTO: 1.42 K/UL (ref 1–4.8)
LYMPHOCYTES NFR BLD: 19 % (ref 22–41)
MCH RBC QN AUTO: 27 PG (ref 27–33)
MCHC RBC AUTO-ENTMCNC: 31.6 G/DL (ref 33.6–35)
MCV RBC AUTO: 85.3 FL (ref 81.4–97.8)
METHADONE UR QL SCN: NEGATIVE
MONOCYTES # BLD AUTO: 0.61 K/UL (ref 0–0.85)
MONOCYTES NFR BLD AUTO: 8.2 % (ref 0–13.4)
NEUTROPHILS # BLD AUTO: 5.25 K/UL (ref 2–7.15)
NEUTROPHILS NFR BLD: 70.4 % (ref 44–72)
NRBC # BLD AUTO: 0 K/UL
NRBC BLD-RTO: 0 /100 WBC
OPIATES UR QL SCN: NEGATIVE
OXYCODONE UR QL SCN: NEGATIVE
PCP UR QL SCN: NEGATIVE
PLATELET # BLD AUTO: 171 K/UL (ref 164–446)
PMV BLD AUTO: 11.8 FL (ref 9–12.9)
POTASSIUM SERPL-SCNC: 4 MMOL/L (ref 3.6–5.5)
PROPOXYPH UR QL SCN: NEGATIVE
PROT SERPL-MCNC: 6.3 G/DL (ref 6–8.2)
RBC # BLD AUTO: 4.34 M/UL (ref 4.2–5.4)
RH BLD: NORMAL
RUBV AB SER QL: 33.3 IU/ML
SODIUM SERPL-SCNC: 136 MMOL/L (ref 135–145)
TREPONEMA PALLIDUM IGG+IGM AB [PRESENCE] IN SERUM OR PLASMA BY IMMUNOASSAY: NON REACTIVE
WBC # BLD AUTO: 7.5 K/UL (ref 4.8–10.8)

## 2019-10-27 PROCEDURE — 85025 COMPLETE CBC W/AUTO DIFF WBC: CPT | Mod: 91

## 2019-10-27 PROCEDURE — 87081 CULTURE SCREEN ONLY: CPT

## 2019-10-27 PROCEDURE — 86780 TREPONEMA PALLIDUM: CPT

## 2019-10-27 PROCEDURE — 80053 COMPREHEN METABOLIC PANEL: CPT

## 2019-10-27 PROCEDURE — 86762 RUBELLA ANTIBODY: CPT

## 2019-10-27 PROCEDURE — 87150 DNA/RNA AMPLIFIED PROBE: CPT

## 2019-10-27 PROCEDURE — 10907ZC DRAINAGE OF AMNIOTIC FLUID, THERAPEUTIC FROM PRODUCTS OF CONCEPTION, VIA NATURAL OR ARTIFICIAL OPENING: ICD-10-PCS | Performed by: OBSTETRICS & GYNECOLOGY

## 2019-10-27 PROCEDURE — 80307 DRUG TEST PRSMV CHEM ANLYZR: CPT

## 2019-10-27 PROCEDURE — 87389 HIV-1 AG W/HIV-1&-2 AB AG IA: CPT

## 2019-10-27 PROCEDURE — 86803 HEPATITIS C AB TEST: CPT

## 2019-10-27 PROCEDURE — 87653 STREP B DNA AMP PROBE: CPT

## 2019-10-27 PROCEDURE — 700105 HCHG RX REV CODE 258

## 2019-10-27 PROCEDURE — 770002 HCHG ROOM/CARE - OB PRIVATE (112)

## 2019-10-27 PROCEDURE — 87340 HEPATITIS B SURFACE AG IA: CPT

## 2019-10-27 PROCEDURE — 76816 OB US FOLLOW-UP PER FETUS: CPT

## 2019-10-27 RX ORDER — SODIUM CHLORIDE, SODIUM LACTATE, POTASSIUM CHLORIDE, CALCIUM CHLORIDE 600; 310; 30; 20 MG/100ML; MG/100ML; MG/100ML; MG/100ML
INJECTION, SOLUTION INTRAVENOUS
Status: COMPLETED
Start: 2019-10-27 | End: 2019-10-27

## 2019-10-27 RX ORDER — SODIUM CHLORIDE, SODIUM LACTATE, POTASSIUM CHLORIDE, CALCIUM CHLORIDE 600; 310; 30; 20 MG/100ML; MG/100ML; MG/100ML; MG/100ML
INJECTION, SOLUTION INTRAVENOUS CONTINUOUS
Status: ACTIVE | OUTPATIENT
Start: 2019-10-27 | End: 2019-10-27

## 2019-10-27 RX ORDER — METHYLERGONOVINE MALEATE 0.2 MG/ML
0.2 INJECTION INTRAVENOUS
Status: DISCONTINUED | OUTPATIENT
Start: 2019-10-27 | End: 2019-10-28 | Stop reason: HOSPADM

## 2019-10-27 RX ORDER — MISOPROSTOL 200 UG/1
800 TABLET ORAL
Status: COMPLETED | OUTPATIENT
Start: 2019-10-27 | End: 2019-10-27

## 2019-10-27 RX ADMIN — SODIUM CHLORIDE, POTASSIUM CHLORIDE, SODIUM LACTATE AND CALCIUM CHLORIDE: 600; 310; 30; 20 INJECTION, SOLUTION INTRAVENOUS at 19:47

## 2019-10-27 RX ADMIN — SODIUM CHLORIDE, SODIUM LACTATE, POTASSIUM CHLORIDE, CALCIUM CHLORIDE: 600; 310; 30; 20 INJECTION, SOLUTION INTRAVENOUS at 19:47

## 2019-10-27 SDOH — HEALTH STABILITY: MENTAL HEALTH: HOW OFTEN DO YOU HAVE 6 OR MORE DRINKS ON ONE OCCASION?: NEVER

## 2019-10-27 SDOH — HEALTH STABILITY: MENTAL HEALTH: HOW OFTEN DO YOU HAVE A DRINK CONTAINING ALCOHOL?: MONTHLY OR LESS

## 2019-10-27 ASSESSMENT — COPD QUESTIONNAIRES
DURING THE PAST 4 WEEKS HOW MUCH DID YOU FEEL SHORT OF BREATH: NONE/LITTLE OF THE TIME
HAVE YOU SMOKED AT LEAST 100 CIGARETTES IN YOUR ENTIRE LIFE: NO/DON'T KNOW
IN THE PAST 12 MONTHS DO YOU DO LESS THAN YOU USED TO BECAUSE OF YOUR BREATHING PROBLEMS: DISAGREE/UNSURE
COPD SCREENING SCORE: 0
DO YOU EVER COUGH UP ANY MUCUS OR PHLEGM?: NO/ONLY WITH OCCASIONAL COLDS OR INFECTIONS

## 2019-10-27 ASSESSMENT — LIFESTYLE VARIABLES: EVER_SMOKED: NEVER

## 2019-10-27 ASSESSMENT — PATIENT HEALTH QUESTIONNAIRE - PHQ9
2. FEELING DOWN, DEPRESSED, IRRITABLE, OR HOPELESS: NOT AT ALL
2. FEELING DOWN, DEPRESSED, IRRITABLE, OR HOPELESS: NOT AT ALL
SUM OF ALL RESPONSES TO PHQ9 QUESTIONS 1 AND 2: 0
SUM OF ALL RESPONSES TO PHQ9 QUESTIONS 1 AND 2: 0
1. LITTLE INTEREST OR PLEASURE IN DOING THINGS: NOT AT ALL
1. LITTLE INTEREST OR PLEASURE IN DOING THINGS: NOT AT ALL

## 2019-10-27 NOTE — PROGRESS NOTES
28yo, , edc11/4, 38.6 presents with c/o UCs. Pt denies LOF, vag bleeding. POS fm. EFM and [place] placed. VSS.  Pt  Is from [place]. She received PNC the first two trimesters but stopped due to circumstances related to moving. She is needing to go on a drive to [place] tomorrow and then return to [place]. She comes in today to make sure that she is safe to drive. She had intercourse last night. She was evaluated in [place] 2 weeks ago and found to be 3-3.5cm/50-70percent effaced.    1215 SVE /-3 vertex intact.   1219 Tamsen updated. Okay to admit.

## 2019-10-27 NOTE — H&P
"  History and Physical    Toña Turner is a 29 y.o. female  -Para:     Gestational Age:  ~38wk by stated dates (SNEHAL  by LMP,  by US per pt report)  Admitted for:   Active Labor  Admitted to  [facility] Labor and Delivery.  Patient received prenatal care: Scant care in [facility] for past 2-3 months    HPI: Patient is admitted with the above mentioned Chief Complaint and States   Loss of fluid:   negative  Abdominal Pain:  negative  Uterine Contractions:  positive  Vaginal Bleeding:  negative  Fetal Movement:  normal  Patient denies fever, chills, nausea, vomiting , headache, visual disturbance, or dysuria  No LMP recorded. Patient is pregnant.  Estimated Date of Delivery:  by LMP,  by stated US dates done in [facility] per pt.  Final SNEHAL: 19    Patient Active Problem List    Diagnosis Date Noted   • Labor and delivery indication for care or intervention 2013       Admitting DX: Pregnancy   Pregnancy Complications:  Limited PNC, Daily marijuana use  OB Risk Factors:   Scant PNC  Labor State:    Active phase labor.    History:   has a past medical history of Pregnancy and Seizure (HCC).    Surgical hx: Tonsillectomy and adenoidectomy   Appendectomy   No surgical or anesthetic complications     OB History    Para Term  AB Living   8 4 4   2     SAB TAB Ectopic Molar Multiple Live Births   2                # Outcome Date GA Lbr John/2nd Weight Sex Delivery Anes PTL Lv   8 Current            7 SAB            6 SAB            5 Term            4 Term            3 Term            2 Term            1               Per pt, all 5 pregnancies were term  without GDM, PIH or delivery complications. Largest child 7lb2oz.    Medications:  None - \"tried gummy prenatal vitamins\" but they made her sick    Allergies:  Amoxicillin; Codeine; Latex; Pcn [penicillins]; and Vicodin [hydrocodone-acetaminophen]    Social:  Denies Etoh or tob use.   Daily marijuana " "smoker for \"morning sickness.\"    ROS:   Neuro: negative    Cardiovascular: negative  Gastro intestinal: negative  Genitourinary: negative            Physical Exam:  Temp 36.6 °C (97.8 °F) (Temporal)   Ht 1.575 m (5' 2\")   Wt 80.7 kg (178 lb)   BMI 32.56 kg/m²   Constitutional: healthy-appearing, Well-developed, well-nourished  No JVD: while supine  HEENT: PERRLA  Breast Exam: negative  Cardio: regular rate and rhythm  Lung: unlabored respirations, no intercostal retractions or accessory muscle use  Abdomen: abdomen is soft without significant tenderness, masses, organomegaly or guarding  Extremity: extremities, peripheral pulses and reflexes normal    Cervical Exam: 70%  Cervix Dilatation: 5  Station: negative 3  Pelvis: Normal  Fetal Assessment: Fetal heart variability: moderate  Fetal Heart Rate decelerations: none  Fetal Heart Rate accelerations: yes  Baseline FHR: 130 per minute  Uterine contractions: regular, every 3-5 minutes  Estimated Fetal Weight: 3000 - 3500g      Labs:  Recent Labs     10/27/19  1300   WBC 7.5   RBC 4.34   HEMOGLOBIN 11.7*   HEMATOCRIT 37.0   MCV 85.3   MCH 27.0   MCHC 31.6*   RDW 44.9   PLATELETCT 171   MPV 11.8     Prenatal Results    The patient does not have a working SNEHAL. Some results will not display without a working SNEHAL.   General (Most Recent Result)     Test Value Date Time    ABO O  11/18/13 2300    Rh POS  11/18/13 2300    Antibody screen       HbA1c       Gonorrhea       Chlamydia  by PCR       Chlamydia by DNA       RPR/Syphilus Non Reactive  11/18/13 2300    HSV 1/2 by PCR (non-serum)       HSV 1/2 (serum)       HSV 1       HSV 2       HPV (16)       HPV (18)       HPV (other)       HBsAg Negative  11/18/13 2300    HIV-1 HIV-2 Antibodies       Rubella 19.90 IU/mL 11/18/13 2300    Tb             Pap Smear (Most Recent Result)     Test Value Date Time    Pap smear       Pap smear w/HPV       Pap smear w/CTNG       Pap smar w/HPV CTNG       Pap smear (reflex HPV ACUS)    "    Pap smear (reflex HPV ASCUS w/CTNG)       Pathology gyn specimen             Urinalysis (Most Recent Result)     Test Value Date Time    Urinalysis  Abnormal    (See Report)   13 0430    Urinalysis (culture if indicated)  Abnormal    (See Report)   10/19/06 0205    POC urinalysis       Urine drug screen       Urine drug screen (w/o conf)       Urine culture ([id number])       Urine culture ([id number])             1st Trimester     Test Value Date Time    Hgb       Hct       Fasting Glucose Tolerance       GTT, 1 hour       GTT, 2 hours       GTT, 3 hours             2nd Trimester     Test Value Date Time    Hgb       Hct       Urinalysis       Urine Culture       AST       ALT       Uric Acid       Fasting Glucose Tolerance       GTT, 1 hour       GTT, 2 hours       GTT, 3 hours       Urine Protein/Creatinine Ratio             3rd Trimester     Test Value Date Time    Hgb       Hct       Platelet count       GBS (AGUILAR BROTH)       GBS (Direct)       Urinalysis       Urine Culture       Urine Drug Screen       Urine Protein/Creatinine Ratio             Congenital Disease Screening     Test Value Date Time    First Trimester Screen       Quad Screen       Sickle Cell       Thalassemia       [facility]       Cystic Fibrosis Carrier Study                     Assessment:  Gestational Age:  38w0d  Labor State:   Labor, Active  Risk Factors:   Marijuana use  Pregnancy Complications: Scant PNC    Patient Active Problem List    Diagnosis Date Noted   • Labor and delivery indication for care or intervention 2013       Plan:   Admitted for: Active Labor, GBS unknown but pt is term and no hx of GBS. Pain control prn, augment with AROM and pitocin prn, anticipate . Also, get PNL asap.       Maddi Wesley PIFTO.

## 2019-10-28 LAB
ERYTHROCYTE [DISTWIDTH] IN BLOOD BY AUTOMATED COUNT: 44.6 FL (ref 35.9–50)
HCT VFR BLD AUTO: 36 % (ref 37–47)
HGB BLD-MCNC: 11.4 G/DL (ref 12–16)
MCH RBC QN AUTO: 26.8 PG (ref 27–33)
MCHC RBC AUTO-ENTMCNC: 31.7 G/DL (ref 33.6–35)
MCV RBC AUTO: 84.5 FL (ref 81.4–97.8)
PATHOLOGY CONSULT NOTE: NORMAL
PLATELET # BLD AUTO: 149 K/UL (ref 164–446)
PMV BLD AUTO: 12.6 FL (ref 9–12.9)
RBC # BLD AUTO: 4.26 M/UL (ref 4.2–5.4)
WBC # BLD AUTO: 9.8 K/UL (ref 4.8–10.8)

## 2019-10-28 PROCEDURE — 700111 HCHG RX REV CODE 636 W/ 250 OVERRIDE (IP): Performed by: OBSTETRICS & GYNECOLOGY

## 2019-10-28 PROCEDURE — A9270 NON-COVERED ITEM OR SERVICE: HCPCS | Performed by: STUDENT IN AN ORGANIZED HEALTH CARE EDUCATION/TRAINING PROGRAM

## 2019-10-28 PROCEDURE — 36415 COLL VENOUS BLD VENIPUNCTURE: CPT

## 2019-10-28 PROCEDURE — 700111 HCHG RX REV CODE 636 W/ 250 OVERRIDE (IP): Performed by: STUDENT IN AN ORGANIZED HEALTH CARE EDUCATION/TRAINING PROGRAM

## 2019-10-28 PROCEDURE — 59409 OBSTETRICAL CARE: CPT | Performed by: OBSTETRICS & GYNECOLOGY

## 2019-10-28 PROCEDURE — 700102 HCHG RX REV CODE 250 W/ 637 OVERRIDE(OP): Performed by: PHYSICIAN ASSISTANT

## 2019-10-28 PROCEDURE — 59409 OBSTETRICAL CARE: CPT

## 2019-10-28 PROCEDURE — 302131 K PAD MOTOR: Performed by: OBSTETRICS & GYNECOLOGY

## 2019-10-28 PROCEDURE — 88307 TISSUE EXAM BY PATHOLOGIST: CPT

## 2019-10-28 PROCEDURE — 10D17Z9 MANUAL EXTRACTION OF PRODUCTS OF CONCEPTION, RETAINED, VIA NATURAL OR ARTIFICIAL OPENING: ICD-10-PCS | Performed by: OBSTETRICS & GYNECOLOGY

## 2019-10-28 PROCEDURE — 700102 HCHG RX REV CODE 250 W/ 637 OVERRIDE(OP): Performed by: STUDENT IN AN ORGANIZED HEALTH CARE EDUCATION/TRAINING PROGRAM

## 2019-10-28 PROCEDURE — 700111 HCHG RX REV CODE 636 W/ 250 OVERRIDE (IP): Performed by: PHYSICIAN ASSISTANT

## 2019-10-28 PROCEDURE — 304965 HCHG RECOVERY SERVICES

## 2019-10-28 PROCEDURE — 85027 COMPLETE CBC AUTOMATED: CPT

## 2019-10-28 PROCEDURE — A9270 NON-COVERED ITEM OR SERVICE: HCPCS | Performed by: PHYSICIAN ASSISTANT

## 2019-10-28 PROCEDURE — 770002 HCHG ROOM/CARE - OB PRIVATE (112)

## 2019-10-28 PROCEDURE — 302152 K-PAD 12X17: Performed by: OBSTETRICS & GYNECOLOGY

## 2019-10-28 RX ORDER — IBUPROFEN 600 MG/1
600 TABLET ORAL EVERY 6 HOURS PRN
Status: DISCONTINUED | OUTPATIENT
Start: 2019-10-28 | End: 2019-10-28

## 2019-10-28 RX ORDER — DOCUSATE SODIUM 100 MG/1
100 CAPSULE, LIQUID FILLED ORAL 2 TIMES DAILY PRN
Status: DISCONTINUED | OUTPATIENT
Start: 2019-10-28 | End: 2019-10-29 | Stop reason: HOSPADM

## 2019-10-28 RX ORDER — IBUPROFEN 800 MG/1
800 TABLET ORAL EVERY 6 HOURS PRN
Status: DISCONTINUED | OUTPATIENT
Start: 2019-10-28 | End: 2019-10-29 | Stop reason: HOSPADM

## 2019-10-28 RX ORDER — OXYCODONE AND ACETAMINOPHEN 10; 325 MG/1; MG/1
1 TABLET ORAL EVERY 4 HOURS PRN
Status: DISCONTINUED | OUTPATIENT
Start: 2019-10-28 | End: 2019-10-29 | Stop reason: HOSPADM

## 2019-10-28 RX ORDER — ACETAMINOPHEN 325 MG/1
650 TABLET ORAL EVERY 4 HOURS PRN
Status: DISCONTINUED | OUTPATIENT
Start: 2019-10-28 | End: 2019-10-29 | Stop reason: HOSPADM

## 2019-10-28 RX ORDER — ONDANSETRON 2 MG/ML
4 INJECTION INTRAMUSCULAR; INTRAVENOUS EVERY 4 HOURS PRN
Status: DISCONTINUED | OUTPATIENT
Start: 2019-10-28 | End: 2019-10-28 | Stop reason: HOSPADM

## 2019-10-28 RX ORDER — CALCIUM CARBONATE 500 MG/1
500 TABLET, CHEWABLE ORAL 3 TIMES DAILY PRN
Status: DISCONTINUED | OUTPATIENT
Start: 2019-10-28 | End: 2019-10-29 | Stop reason: HOSPADM

## 2019-10-28 RX ORDER — OXYCODONE HYDROCHLORIDE AND ACETAMINOPHEN 5; 325 MG/1; MG/1
1 TABLET ORAL EVERY 4 HOURS PRN
Status: DISCONTINUED | OUTPATIENT
Start: 2019-10-28 | End: 2019-10-29 | Stop reason: HOSPADM

## 2019-10-28 RX ORDER — SODIUM CHLORIDE, SODIUM LACTATE, POTASSIUM CHLORIDE, CALCIUM CHLORIDE 600; 310; 30; 20 MG/100ML; MG/100ML; MG/100ML; MG/100ML
INJECTION, SOLUTION INTRAVENOUS PRN
Status: DISCONTINUED | OUTPATIENT
Start: 2019-10-28 | End: 2019-10-29 | Stop reason: HOSPADM

## 2019-10-28 RX ORDER — ACETAMINOPHEN 325 MG/1
325 TABLET ORAL EVERY 4 HOURS PRN
Status: DISCONTINUED | OUTPATIENT
Start: 2019-10-28 | End: 2019-10-29 | Stop reason: HOSPADM

## 2019-10-28 RX ORDER — VITAMIN A ACETATE, BETA CAROTENE, ASCORBIC ACID, CHOLECALCIFEROL, .ALPHA.-TOCOPHEROL ACETATE, DL-, THIAMINE MONONITRATE, RIBOFLAVIN, NIACINAMIDE, PYRIDOXINE HYDROCHLORIDE, FOLIC ACID, CYANOCOBALAMIN, CALCIUM CARBONATE, FERROUS FUMARATE, ZINC OXIDE, CUPRIC OXIDE 3080; 12; 120; 400; 1; 1.84; 3; 20; 22; 920; 25; 200; 27; 10; 2 [IU]/1; UG/1; MG/1; [IU]/1; MG/1; MG/1; MG/1; MG/1; MG/1; [IU]/1; MG/1; MG/1; MG/1; MG/1; MG/1
1 TABLET, FILM COATED ORAL EVERY MORNING
Status: DISCONTINUED | OUTPATIENT
Start: 2019-10-28 | End: 2019-10-29 | Stop reason: HOSPADM

## 2019-10-28 RX ADMIN — OXYTOCIN 2 MILLI-UNITS/MIN: 10 INJECTION, SOLUTION INTRAMUSCULAR; INTRAVENOUS at 00:30

## 2019-10-28 RX ADMIN — OXYCODONE HYDROCHLORIDE AND ACETAMINOPHEN 1 TABLET: 10; 325 TABLET ORAL at 19:22

## 2019-10-28 RX ADMIN — IBUPROFEN 600 MG: 600 TABLET ORAL at 02:52

## 2019-10-28 RX ADMIN — OXYCODONE HYDROCHLORIDE AND ACETAMINOPHEN 1 TABLET: 10; 325 TABLET ORAL at 14:07

## 2019-10-28 RX ADMIN — OXYCODONE HYDROCHLORIDE AND ACETAMINOPHEN 1 TABLET: 10; 325 TABLET ORAL at 08:53

## 2019-10-28 RX ADMIN — ONDANSETRON 4 MG: 2 INJECTION INTRAMUSCULAR; INTRAVENOUS at 01:19

## 2019-10-28 RX ADMIN — IBUPROFEN 800 MG: 800 TABLET, FILM COATED ORAL at 08:49

## 2019-10-28 RX ADMIN — IBUPROFEN 800 MG: 800 TABLET, FILM COATED ORAL at 19:20

## 2019-10-28 RX ADMIN — ANTACID TABLETS 500 MG: 500 TABLET, CHEWABLE ORAL at 01:21

## 2019-10-28 RX ADMIN — FENTANYL CITRATE 100 MCG: 50 INJECTION INTRAMUSCULAR; INTRAVENOUS at 01:34

## 2019-10-28 RX ADMIN — OXYTOCIN 125 ML/HR: 10 INJECTION, SOLUTION INTRAMUSCULAR; INTRAVENOUS at 02:22

## 2019-10-28 ASSESSMENT — PATIENT HEALTH QUESTIONNAIRE - PHQ9
2. FEELING DOWN, DEPRESSED, IRRITABLE, OR HOPELESS: NOT AT ALL
1. LITTLE INTEREST OR PLEASURE IN DOING THINGS: NOT AT ALL
SUM OF ALL RESPONSES TO PHQ9 QUESTIONS 1 AND 2: 0

## 2019-10-28 NOTE — PROGRESS NOTES
0700 - Bedside report received from Margi GAMBINO RN. Patient care assumed. Chart and orders reviewed  0800 - Pt assessment complete, wnl. Fundus firm with minimal discharge. Pt ambulating to bathroom and voiding without difficulty. Patient denies any dizziness or lightheadedness at this time. Patient denies any calf pain or tenderness at this time. Plan of care discussed with patient for the day including infant feeding every 2-3 hours or on demand, pain management, and ambulation in halls. Pain medication plan discussed with patient; patient states she will call if PRN pain medication is wanted. All questions/concerns addressed at this time. Call light within reach, encouraged to call with needs. Will continue with routine postpartum cares.

## 2019-10-28 NOTE — PROGRESS NOTES
Patient arrived from L&D with infant and FOB. ID bands verified. Oriented to the room. Assessment complete. Fundus firm, lochia light. Pain 4/10, heat packs in use. RN will bring pain medication when it becomes available. Discussed plan of care for the night. Reviewed safe sleep. Call light within reach. Encouraged patient to call with any questions or concerns.

## 2019-10-28 NOTE — PROGRESS NOTES
"[facility] LABOR AND DELIVERY PROGRESS NOTE    PATIENT ID:  NAME:  Toña Turner  MRN:               [id number]  YOB: 1990     29 y.o. female  at 38w5d.    Subjective: She is uncomfortable and having back pain. She is hoping to move the delivery along quickly.    positive  For CTXS.   positive Feels pain   negative for LOF  negative for vaginal bleeding.   positive for fetal movement    Objective:    Vitals:    10/27/19 1200 10/27/19 1201 10/27/19 194   BP:  125/72 124/85   Pulse:  (!) 55 (!) 56   Temp: 36.6 °C (97.8 °F) 36.1 °C (97 °F) 36.8 °C (98.2 °F)   TempSrc: Temporal Temporal Temporal   Weight: 80.7 kg (178 lb)     Height: 1.575 m (5' 2\")         Cervix:  5cm/70%/-3 per RN  [place]: Uterine Contractions Q9 minutes. Irregular.  FHRM: Baseline 125, Accels to 150, no decels, moderate variability  Pitocin: none  Pain control: none    GBS: negative    Assessment: 29 y.o. female  at 38w5d. Her BOW is intact, and she has been making slow cervical change. Baby still looks good.     Plan:   1. AROM  2. Anticipate vaginal delivery    "

## 2019-10-28 NOTE — PROGRESS NOTES
D/w pt results of US showing 35w5d fetus, LAMBERTO = 23.9cm, possible IUGR vs incorrect dating. D/w Dr Meeks and will plan to get US from [facility] to compare, as we have no records to compare at this time. D/w pt that we will plan to augment labor if US is comparable and showing IUGR, or we will wait for active labor if it confirms she is 35w5d.     Pt requests cervical exam - cervix: 5-6/50/-3, BBOW, vtx.   NST: Cat 1  TOCO: Irritability only    A/P; IUP at ~38wk by stated dates in early labor - will get records now and make plan once we have comparable US.

## 2019-10-28 NOTE — PROGRESS NOTES
Assumed care. PT comfortable, denies feeling UC's    1440 Tamsen at BS for US.    1450 US results less than dates, Growth US ordered per MD.     1605 US at BS.     1730 MD aware of US results, release sent to [facility] for prior US record.    1815 Record were obtained, dates 11/4, PT 38.5. Will be induced.    1900 Report to Kera.

## 2019-10-28 NOTE — CARE PLAN
Problem: Knowledge Deficit  Goal: Patient/Support person demonstrates understanding regarding the progression of labor, available options and participates in decision-making process  Outcome: PROGRESSING AS EXPECTED  Note:   POC reviewed and understanding verbalized.     Problem: Pain  Goal: Alleviation of Pain or a reduction in pain to the patient's comfort goal  Outcome: PROGRESSING AS EXPECTED  Note:   Pt denies pain and aware of her pain management options.

## 2019-10-28 NOTE — DISCHARGE PLANNING
:    Received a referral to see MOB for limited prenatal care and history of marijuana use.  MOB's UDS is positive for THC and infant's drug screen is pending.      Plan:  Waiting on infant's UDS results.  SW will see patient tomorrow.

## 2019-10-28 NOTE — LACTATION NOTE
Met with mother briefly, multiple visitors in room, reports all of her older children did not latch at the breast and she formula fed. Reports she will try to latch infant but plans to feed formula if latching unsuccessful. Encouraged mother to spend time with infant skin to skin and call when ready for latch assistance. Noted marijuana history on chart, provided primary RN written education on marijuana and breastfeeding to review with mother after visitors leave.

## 2019-10-28 NOTE — L&D DELIVERY NOTE
Vaginal Delivery Procedure Note:    Toña Turner is a 29 y.o. , now Para 6026 admitted for IOL she was found to have fetal growth restriction.  Her labor course progressed with AROM and Pitocin.    PreDelivery Diagnosis:  1. SIUP @ 38w5  2. Fetal growth restriction  3. Limited prenatal care    PostDelivery Diagnosis:  1. S/p       Procedure in Detail:  Called to room for the patient imminently delivering.  Patient in dorsolithotomy position with an intact bed.  Pt pushed for 1 contraction and head delivered easily.  Followed immediately by bilateral shoulders and body.  Infant delivered @ 0141.  There was no nuchal cord.  Infant was placed on the maternal abdomen and was stimulated and bulb suctioned.  Cord clamping was delayed x60 seconds then the cord was clamped x2 and cut.  Infant APGARs 8 and 9 and 1 and 5 minutes, respectively.  Birth weight 2645g.  IV pitocin bolus was initiated.  Placenta did not deliver spontaneously but with small maternal bleeding noted and on exam placenta was sitting mostly in the PEYTON.  Placental manually removed with trailing membranes noted.  These were removed manually and additional manner uterine examination revealed no further evidence of retained products of conception.  The vagina and perineum were examined revealing no lacerations.  The uterus was firm with IV pitocin and fundal massage.  Pt and infant left bonding in LDR.    EBL 400cc  Anesthesia - none  Sponge and needle counts correct.  Patient tolerated procedure well.    Anticipate routine postparum care.      Mary Krishna MD  [facility], Women's Health

## 2019-10-28 NOTE — CARE PLAN
Problem: Altered physiologic condition related to immediate post-delivery state and potential for bleeding/hemorrhage  Goal: Patient physiologically stable as evidenced by normal lochia, palpable uterine involution and vital signs within normal limits  Outcome: PROGRESSING AS EXPECTED  Note:   Fundus firm, lochia light, VSS, will continue to monitor     Problem: Alteration in comfort related to episiotomy, vaginal repair and/or after birth pains  Goal: Patient is able to ambulate, care for self and infant  Outcome: PROGRESSING AS EXPECTED  Note:   Patient ambulatory, able to care for self and infant

## 2019-10-28 NOTE — PROGRESS NOTES
OB Progress Note:      Pt uncomfortable.      Repeat SVE unchanged, 5/70/-3, AROM clear.    FHT cat I    Will start pit  GBS returned neg.      Mary Krishna MD  [facility], Women's Health

## 2019-10-28 NOTE — LACTATION NOTE
Checked back with mother, reports infant sleepy, mother requests to eat her lunch first and allow her pain medication to work prior to trying breastfeeding due to uterine cramping. Encouraged to call when ready.

## 2019-10-28 NOTE — PROGRESS NOTES
US report received from [facility]   7/2/19, pregnancy measuring 20w5d, SNEHAL 11/4/19.  2VC noted, anatomy normal although spine, cardiac chambers and ventricular outflow tracts not well visualized.        Given now 38w5d with severe growth restriction (3%) and 5cm, will augment labor with AROM, pitocin as needed.  GBS to be sent now.      Mary Krishna MD  [facility], Women's Health

## 2019-10-28 NOTE — CARE PLAN
Problem: Altered physiologic condition related to immediate post-delivery state and potential for bleeding/hemorrhage  Goal: Patient physiologically stable as evidenced by normal lochia, palpable uterine involution and vital signs within normal limits  Outcome: PROGRESSING AS EXPECTED  Note:   Fundus is firm, lochia is light and vital signs are stable. Will continue to monitor with Q6 hour checks and patient rounding     Problem: Potential for postpartum infection related to presence of episiotomy/vaginal tear and/or uterine contamination  Goal: Patient will be absent from signs and symptoms of infection  Outcome: PROGRESSING AS EXPECTED  Note:   Patient does not exhibit any signs/symptoms of infection and afebrile. Will continue to monitor with Q6 hour checks and patient rounding.

## 2019-10-28 NOTE — PROGRESS NOTES
1900_ Assumed pt care. Report from MATTHEW Kumar RN. POC reviewed and understanding verbalized.  0018_ Dr Meeks @ bedside. AROM clear. Orders received to start pitocin.  0141_  viable female apgars 8/9  0156_ Placenta delivered manually and sent to pathology  0317_ Pt up to the bathroom and celso care done. Pt transferred to  with baby by wheelchair in stable condition. Report to SHAHIDA Kiser.

## 2019-10-29 VITALS
TEMPERATURE: 97.9 F | SYSTOLIC BLOOD PRESSURE: 131 MMHG | WEIGHT: 178 LBS | BODY MASS INDEX: 32.76 KG/M2 | OXYGEN SATURATION: 94 % | HEIGHT: 62 IN | HEART RATE: 46 BPM | RESPIRATION RATE: 16 BRPM | DIASTOLIC BLOOD PRESSURE: 81 MMHG

## 2019-10-29 LAB — GP B STREP DNA SPEC QL NAA+PROBE: NEGATIVE

## 2019-10-29 PROCEDURE — A9270 NON-COVERED ITEM OR SERVICE: HCPCS | Performed by: STUDENT IN AN ORGANIZED HEALTH CARE EDUCATION/TRAINING PROGRAM

## 2019-10-29 PROCEDURE — 700102 HCHG RX REV CODE 250 W/ 637 OVERRIDE(OP): Performed by: PHYSICIAN ASSISTANT

## 2019-10-29 PROCEDURE — 700102 HCHG RX REV CODE 250 W/ 637 OVERRIDE(OP): Performed by: STUDENT IN AN ORGANIZED HEALTH CARE EDUCATION/TRAINING PROGRAM

## 2019-10-29 PROCEDURE — A9270 NON-COVERED ITEM OR SERVICE: HCPCS | Performed by: PHYSICIAN ASSISTANT

## 2019-10-29 RX ORDER — OXYCODONE HYDROCHLORIDE 5 MG/1
5 TABLET ORAL EVERY 4 HOURS PRN
Qty: 15 TAB | Refills: 0 | Status: SHIPPED | OUTPATIENT
Start: 2019-10-29 | End: 2019-11-14

## 2019-10-29 RX ORDER — IBUPROFEN 800 MG/1
800 TABLET ORAL EVERY 8 HOURS PRN
Qty: 30 TAB | Refills: 1 | Status: SHIPPED | OUTPATIENT
Start: 2019-10-29

## 2019-10-29 RX ORDER — VITAMIN A ACETATE, BETA CAROTENE, ASCORBIC ACID, CHOLECALCIFEROL, .ALPHA.-TOCOPHEROL ACETATE, DL-, THIAMINE MONONITRATE, RIBOFLAVIN, NIACINAMIDE, PYRIDOXINE HYDROCHLORIDE, FOLIC ACID, CYANOCOBALAMIN, CALCIUM CARBONATE, FERROUS FUMARATE, ZINC OXIDE, CUPRIC OXIDE 3080; 12; 120; 400; 1; 1.84; 3; 20; 22; 920; 25; 200; 27; 10; 2 [IU]/1; UG/1; MG/1; [IU]/1; MG/1; MG/1; MG/1; MG/1; MG/1; [IU]/1; MG/1; MG/1; MG/1; MG/1; MG/1
1 TABLET, FILM COATED ORAL EVERY MORNING
Qty: 30 TAB | Refills: 3 | Status: SHIPPED | OUTPATIENT
Start: 2019-10-30

## 2019-10-29 RX ORDER — ACETAMINOPHEN 500 MG
500-1000 TABLET ORAL EVERY 6 HOURS PRN
Qty: 60 TAB | Refills: 1 | Status: SHIPPED | OUTPATIENT
Start: 2019-10-29 | End: 2019-11-28

## 2019-10-29 RX ORDER — PSEUDOEPHEDRINE HCL 30 MG
100 TABLET ORAL 2 TIMES DAILY PRN
Qty: 60 CAP | Refills: 0 | Status: SHIPPED | OUTPATIENT
Start: 2019-10-29

## 2019-10-29 RX ORDER — IBUPROFEN 800 MG/1
800 TABLET ORAL EVERY 6 HOURS PRN
Qty: 30 TAB | Refills: 0 | Status: SHIPPED | OUTPATIENT
Start: 2019-10-29

## 2019-10-29 RX ADMIN — IBUPROFEN 800 MG: 800 TABLET, FILM COATED ORAL at 05:36

## 2019-10-29 RX ADMIN — VITAMIN A, VITAMIN C, VITAMIN D, VITAMIN E, THIAMINE, RIBOFLAVIN, NIACIN, VITAMIN B6, FOLIC ACID, VITAMIN B12, CALCIUM, IRON, ZINC, COPPER 1 TABLET: 4000; 120; 400; 22; 1.84; 3; 20; 10; 1; 12; 200; 27; 25; 2 TABLET ORAL at 05:36

## 2019-10-29 RX ADMIN — OXYCODONE HYDROCHLORIDE AND ACETAMINOPHEN 1 TABLET: 5; 325 TABLET ORAL at 11:18

## 2019-10-29 RX ADMIN — OXYCODONE HYDROCHLORIDE AND ACETAMINOPHEN 1 TABLET: 5; 325 TABLET ORAL at 05:36

## 2019-10-29 ASSESSMENT — EDINBURGH POSTNATAL DEPRESSION SCALE (EPDS)
I HAVE LOOKED FORWARD WITH ENJOYMENT TO THINGS: AS MUCH AS I EVER DID
I HAVE BEEN ABLE TO LAUGH AND SEE THE FUNNY SIDE OF THINGS: AS MUCH AS I ALWAYS COULD
I HAVE FELT SAD OR MISERABLE: NO, NOT AT ALL
THE THOUGHT OF HARMING MYSELF HAS OCCURRED TO ME: NEVER
I HAVE BEEN ANXIOUS OR WORRIED FOR NO GOOD REASON: NO, NOT AT ALL
I HAVE BEEN SO UNHAPPY THAT I HAVE BEEN CRYING: NO, NEVER
I HAVE BLAMED MYSELF UNNECESSARILY WHEN THINGS WENT WRONG: NOT VERY OFTEN
I HAVE FELT SCARED OR PANICKY FOR NO GOOD REASON: NO, NOT AT ALL
THINGS HAVE BEEN GETTING ON TOP OF ME: NO, I HAVE BEEN COPING AS WELL AS EVER
I HAVE BEEN SO UNHAPPY THAT I HAVE HAD DIFFICULTY SLEEPING: NOT AT ALL

## 2019-10-29 NOTE — PROGRESS NOTES
1900-- Received report from Darlin RN, Infant at bedside in open crib no signs of distress.  Pt resting in bed. Discussed pain management for the day.  No further needs at the time.  Call light within reach, bed locked and in lowest position.  Rounding in place.    2200-- Assessment completed, VSS, pt declines the need for pain intervention at this time.  Asked MOB if she would light help with breastfeeding, MOB stated she would like to start breastfeeding later.  Educated Pt on milk initiation, Pt states she would still like to breastfeed later and will call when she needs help. Discussed plan of care for the night that pt is comfortable with.  All questions answered at this time.  Will continue to monitor.

## 2019-10-29 NOTE — CARE PLAN
Problem: Altered physiologic condition related to immediate post-delivery state and potential for bleeding/hemorrhage  Goal: Patient physiologically stable as evidenced by normal lochia, palpable uterine involution and vital signs within normal limits  Outcome: PROGRESSING AS EXPECTED     Problem: Potential knowledge deficit related to lack of understanding of self and  care  Goal: Patient will verbalize understanding of self and infant care  Outcome: PROGRESSING AS EXPECTED

## 2019-10-29 NOTE — PROGRESS NOTES
Report received at 0700. Assessment completed. VSS. Patient ambulates by self. Pain 2/10. Wants meds when calls. Fundus firm and scant lochia rubra. Call light in place. POC discussed with patient.

## 2019-10-29 NOTE — DISCHARGE SUMMARY
Discharge Summary:      Toña Turner      Admit Date:   10/27/2019  Discharge Date:  10/29/2019     Admitting diagnosis:  Pregnancy  Indication for care in labor or delivery  Discharge Diagnosis: Status post  of baby girl  Pregnancy Complications: precipitous delivery  Tubal Ligation:  no        History:  Past Medical History:   Diagnosis Date   • Pregnancy    • Seizure (HCC)      OB History    Para Term  AB Living   8 5 4   2 1   SAB TAB Ectopic Molar Multiple Live Births   2       0 1      # Outcome Date GA Lbr John/2nd Weight Sex Delivery Anes PTL Lv   8 Para 10/28/19  / 00:01 2.645 kg (5 lb 13.3 oz) F Vag-Spont None N MACHO   7 SAB            6 SAB            5 Term            4 Term            3 Term            2 Term            1                  Amoxicillin; Codeine; Latex; Pcn [penicillins]; and Vicodin [hydrocodone-acetaminophen]  There are no active problems to display for this patient.       Hospital Course:   29 y.o. now , was admitted with the above mentioned diagnosis, underwent precipitous vaginal delivery. Patient postpartum course was unremarkable, with progressive advancement in diet, ambulation and toleration of oral analgesia. Patient without complaints today and desires discharge.      Abdominal pain: moderate  Ambulating: yes  tolerating liquids: yes  tolerating regular diet: yes  Flatus: yes  BM: yes  Bleeding: minimal  Voiding: yes  Dizziness: no  Breast feeding: yes  Breast tenderness: no    Vitals:    10/28/19 1000 10/28/19 1400 10/28/19 1800 10/29/19 0600   BP: 128/73 123/64 122/72 131/81   Pulse: (!) 58 (!) 56 (!) 53 (!) 46   Resp: 18 20 20 16   Temp: 36.3 °C (97.4 °F) 36 °C (96.8 °F) 36.6 °C (97.8 °F) 36.6 °C (97.9 °F)   TempSrc: Temporal Temporal Temporal Temporal   SpO2: 96% 97% 96% 94%   Weight:       Height:           Current Facility-Administered Medications   Medication Dose   • oxytocin (PITOCIN) infusion (for postpartum)   mL/hr   •  acetaminophen (TYLENOL) tablet 325 mg  325 mg   • oxyCODONE-acetaminophen (PERCOCET) 5-325 MG per tablet 1 Tab  1 Tab   • oxyCODONE-acetaminophen (PERCOCET-10)  MG per tablet 1 Tab  1 Tab   • oxytocin (PITOCIN) 20 UNITS/1000ML LR (induction of labor)  0.5-20 cielo-units/min   • calcium carbonate (TUMS) chewable tab 500 mg  500 mg   • LR infusion     • PRN oxytocin (PITOCIN) (20 Units/1000 mL) PRN for excessive uterine bleeding - See Admin Instr  125-999 mL/hr   • prenatal plus vitamin (STUARTNATAL 1+1) 27-1 MG tablet 1 Tab  1 Tab   • docusate sodium (COLACE) capsule 100 mg  100 mg   • acetaminophen (TYLENOL) tablet 650 mg  650 mg   • ibuprofen (MOTRIN) tablet 800 mg  800 mg       Exam:  Vitals:    10/28/19 1000 10/28/19 1400 10/28/19 1800 10/29/19 0600   BP: 128/73 123/64 122/72 131/81   Pulse: (!) 58 (!) 56 (!) 53 (!) 46   Resp: 18 20 20 16   Temp: 36.3 °C (97.4 °F) 36 °C (96.8 °F) 36.6 °C (97.8 °F) 36.6 °C (97.9 °F)   TempSrc: Temporal Temporal Temporal Temporal   SpO2: 96% 97% 96% 94%   Weight:       Height:         General: No acute distress, resting comfortably in bed.  HEENT: normocephalic, nontraumatic, EOMI  Cardiovascular: Heart RRR with no murmurs, rubs or gallops. Distal Pulses 2+  Respiratory: symmetric chest expansion, lungs CTA bilaterally with no wheezes rales or rhonci  Abdomen: soft, mildly tender, fundus firm, +BS  Genitourinary: lochia light, denies excessive vaginal bleeding  Musculoskeletal: strength 5/5 in four extremities, no calf tenderness  Neuro: no focal deficits noted       Labs:  Recent Labs     10/27/19  1300 10/28/19  1149   WBC 7.5 9.8   RBC 4.34 4.26   HEMOGLOBIN 11.7* 11.4*   HEMATOCRIT 37.0 36.0*   MCV 85.3 84.5   MCH 27.0 26.8*   MCHC 31.6* 31.7*   RDW 44.9 44.6   PLATELETCT 171 149*   MPV 11.8 12.6        Activity:   Discharge to home  Pelvic Rest x 6 weeks    Assessment:  Normal postpartum course with some increased pain due to precipitous delivery.     Follow up: .  [facility]PC 5  weeks for post partum follow up.   To resume daily PNV and iron supplement if needed with hydration.     Patient to return to [facility]PC or ER if any of the following occur:   Fever over 100.5   Severe abdominal pain   Red streaks or painful masses in the breasts   Foul smelling discharge or lochia   Heavy vaginal bleeding saturating a pad per hour   S/s of PP depression     Discharge Meds:      Medication List      START taking these medications      Instructions   acetaminophen 500 MG Tabs  Commonly known as:  TYLENOL   Take 1-2 Tabs by mouth every 6 hours as needed for Mild Pain for up to 30 days.  Dose:  500-1,000 mg     docusate sodium 100 MG Caps   Take 100 mg by mouth 2 times a day as needed for Constipation.  Dose:  100 mg     ibuprofen 800 MG Tabs  Commonly known as:  MOTRIN   Take 1 Tab by mouth every 6 hours as needed.  Dose:  800 mg     oxyCODONE immediate-release 5 MG Tabs  Commonly known as:  ROXICODONE   Take 1 Tab by mouth every four hours as needed for Severe Pain for up to 15 doses.  Dose:  5 mg     prenatal plus vitamin 27-1 MG Tabs tablet  Start taking on:  10/30/2019   Take 1 Tab by mouth every morning.  Dose:  1 Tab            Isaiah Moreno M.D.

## 2019-10-29 NOTE — DISCHARGE INSTRUCTIONS
POSTPARTUM DISCHARGE INSTRUCTIONS FOR MOM    YOB: 1990   Age: 29 y.o.               Admit Date: 10/27/2019     Discharge Date: 10/29/2019  Attending Doctor:  ERIKA Sparks*                  Allergies:  Amoxicillin; Codeine; Latex; Pcn [penicillins]; and Vicodin [hydrocodone-acetaminophen]    Discharged to home by car. Discharged via wheelchair, hospital escort: Yes.  Special equipment needed: Not Applicable  Belongings with: Personal  Be sure to schedule a follow-up appointment with your primary care doctor or any specialists as instructed.     Discharge Plan:   Diet Plan: Discussed  Activity Level: Discussed  Confirmed Follow up Appointment: Patient to Call and Schedule Appointment  Confirmed Symptoms Management: Discussed  Medication Reconciliation Updated: Yes  Influenza Vaccine Indication: Patient Refuses    REASONS TO CALL YOUR OBSTETRICIAN:  1.   Persistent fever or shaking chills (Temperature higher than 100.4)  2.   Heavy bleeding (soaking more than 1 pad per hour); Passing clots  3.   Foul odor from vagina  4.   Mastitis (Breast infection; breast pain, chills, fever, redness)  5.   Urinary pain, burning or frequency  6.   Episiotomy infection  7.   Abdominal incision infection  8.   Severe depression longer than 24 hours    HAND WASHING  · Prior to handling the baby.  · Before breastfeeding or bottle feeding baby.  · After using the bathroom or changing the baby's diaper.    WOUND CARE  Ask your physician for additional care instructions.  In general:    ·  Incision:      · Keep clean and dry.    · Do NOT lift anything heavier than your baby for up to 6 weeks.    · There should not be any opening or pus.      VAGINAL CARE  · Nothing inside vagina for 6 weeks: no sexual intercourse, tampons or douching.  · Bleeding may continue for 2-4 weeks.  Amount may vary.    · Call your physician for heavy bleeding which means soaking more than 1 pad per hour    BIRTH CONTROL  · It is  "possible to become pregnant at any time after delivery and while breastfeeding.  · Plan to discuss a method of birth control with your physician at your follow up visit. visit.    DIET AND ELIMINATION  · Eating more fiber (bran cereal, fruits, and vegetables) and drinking plenty of fluids will help to avoid constipation.  · Urinary frequency after childbirth is normal.    POSTPARTUM BLUES  During the first few days after birth, you may experience a sense of the \"blues\" which may include impatience, irritability or even crying.  These feeling come and go quickly.  However, as many as 1 in 10 women experience emotional symptoms known as postpartum depression.    Postpartum depression:  May start as early as the second or third day after delivery or take several weeks or months to develop.  Symptoms of \"blues\" are present, but are more intense:  Crying spells; loss of appetite; feelings of hopelessness or loss of control; fear of touching the baby; over concern or no concern at all about the baby; little or no concern about your own appearance/caring for yourself; and/or inability to sleep or excessive sleeping.  Contact your physician if you are experiencing any of these symptoms.    Crisis Hotline:  · [place] Crisis Hotline:  9-503-MAITEDY  Or 1-136.311.8515  · [place] Crisis Hotline:  1-936.567.7938  Or 539-970-1389    PREVENTING SHAKEN BABY:  If you are angry or stressed, PUT THE BABY IN THE CRIB, step away, take some deep breaths, and wait until you are calm to care for the baby.  DO NOT SHAKE THE BABY.  You are not alone, call a supporter for help.    · Crisis Call Center 24/7 crisis line 062-167-2642 or 1-158.941.7052  · You can also text them, text \"ANSWER\" to 859702    QUIT SMOKING/TOBACCO USE:  I understand the use of any tobacco products increases my chance of suffering from future heart disease and could cause other illnesses which may shorten my life. Quitting the use of tobacco products is the single most " important thing I can do to improve my health. For further information on smoking / tobacco cessation call a Toll Free Quit Line at 1-188.751.5396 (*[employer] [place]) or 1-576.280.2031 (American Lung Association) or you can access the web based program at www.lungusa.org.    · [place] Tobacco Users Help Line:  (899) 178-4786       Toll Free: 1-354.592.4075  · Quit Tobacco Program [facility] (974)542-8177    DEPRESSION / SUICIDE RISK:  As you are discharged from this [facility], it is important to learn how to keep safe from harming yourself.    Recognize the warning signs:  · Abrupt changes in personality, positive or negative- including increase in energy   · Giving away possessions  · Change in eating patterns- significant weight changes-  positive or negative  · Change in sleeping patterns- unable to sleep or sleeping all the time   · Unwillingness or inability to communicate  · Depression  · Unusual sadness, discouragement and loneliness  · Talk of wanting to die  · Neglect of personal appearance   · Rebelliousness- reckless behavior  · Withdrawal from people/activities they love  · Confusion- inability to concentrate     If you or a loved one observes any of these behaviors or has concerns about self-harm, here's what you can do:  · Talk about it- your feelings and reasons for harming yourself  · Remove any means that you might use to hurt yourself (examples: pills, rope, extension cords, firearm)  · Get professional help from the community (Mental Health, Substance Abuse, psychological counseling)  · Do not be alone:Call your Safe Contact- someone whom you trust who will be there for you.  · Call your local CRISIS HOTLINE 017-1786 or 121-863-1108  · Call your local Children's Mobile Crisis Response Team [place] (823) 283-3084 or www.[employer]  · Call the toll free National Suicide Prevention Hotlines   · National Suicide Prevention Lifeline 937-604-MWTW  (7082)  · [facility] 800-SUICIDE (899-6222)    DISCHARGE SURVEY:  Thank you for choosing [facility].  We hope we provided you with very good care.  You may be receiving a survey in the mail.  Please fill it out.  Your opinion is valuable to us.    ADDITIONAL EDUCATIONAL MATERIALS GIVEN TO PATIENT:        My signature on this form indicates that:  1.  I have reviewed and understand the above information  2.  My questions regarding this information have been answered to my satisfaction.  3.  I have formulated a plan with my discharge nurse to obtain my prescribed medication for home.

## 2019-10-29 NOTE — DISCHARGE PLANNING
Discharge Planning Assessment Post Partum     Reason for Referral: Limited prenatal care and history of marijuana use  Address: [address] [place] [id number]  Phone: [phone]  Type of Living Situation: living with FOB and two sons  Mom Diagnosis: Pregnancy  Baby Diagnosis: [place]  Primary Language: English     Name of Baby: Melodie Matos (: 10/28/19)  Father of the Baby: Tee Matos (: 86)  Involved in baby’s care? Yes  Contact Information: [phone]  Employed at [employer]     Prenatal Care: Yes, limited prenatal care in [place].  MOB stated she was having difficulties with the scheduling office in her OB's office.  Mom's PCP: None  PCP for new baby: Will F/U with [facility] with 1st visit and then plans to find a pediatrician in [place], [place]     Support System: [facility]  Coping/Bonding between mother & baby: Yes  Source of Feeding: breast and bottle  Supplies for Infant: prepared for infant; has a car seat, clothes, diapers, and blankets     Mom's Insurance: Medicaid FFS  Baby Covered on Insurance:Yes  Mother Employed/School: Not currently  Other children in the home/names & ages: 6 year old son-Roberto Cain (12) and 5 year old son-Sailor Cain (13).  MEHREEN has 3 other children that live with their father in [place]; Ghassan-13, Raylynn-11, and Jaydi-10     Financial Hardship/Income: denies   Mom's Mental status: alert and oriented  Services used prior to admit: Medicaid, food stamps, and WIC     CPS History: Report made to [facility] DCFS at [phone].  Reported information to Beba Jordan.  Report is classified as information only.  Psychiatric History: No  Domestic Violence History: No  Drug/ETOH History: Yes, admits to using marijuana to help with nausea.  MOB and infant's UDS are both positive for THC.  MOB is aware that she should not use marijuana while breast feeding.     Resources Provided: pediatrician list, children and family resource list, and  counseling resources for post partum depression  Referrals Made: diaper bank referral was provided      Clearance for Discharge: Infant is cleared to discharge home with MOB

## 2019-10-29 NOTE — LACTATION NOTE
This note was copied from a baby's chart.  Met with MOB for a lactation follow up visit.  MOB stated her preferred method of feeding infant is breastfeeding, but MOB stated that if infant continues to refuse the breast, then she will feed infant formula only.  MOB stated she has made several attempts at latching infant onto the breast, but that infant shows no interest in breastfeeding.  MOB asked if a nipple shield might help infant in becoming interested in the breast and MOB was informed that infant's attempt at latching onto the breast would have to be assessed first.  MOB instructed to call to have latch attempt assessed with the next feed which is due at approximately 1105.    Breastfeeding Plan:  Attempt to put infant to the breast first at each feeding and supplement all feeds with formula per the 10-20-30 supplementation guidelines.    MOB stated has WIC and is seen at the office in [place].  MOB encouraged to follow up with WIC with any lactation concerns/questions that arise post discharge.    MOB verbalized understanding of all information provided to her and denied having any further questions at this time.  Encouraged MOB to call for lactation assistance as needed.    1120- In room for latch assistance.  Infant undressed down to the diaper and placed skin to skin with MOB.  Assisted MOB with positioning infant at the left breast in the cross cradle position.  Encouraged MOB to use pillows underneath infant's body and MOB's arms for maximum support and comfort.  Infant latched onto the breast immediately.  Discussed nutritive and non-nutritive feeding with MOB.  MOB denied pain with latch.  MOB instructed to continue to supplement feeds with formula per the 10-20-30 supplementation guidelines after all breastfeeds and to follow up with WIC to ensure adequate milk transfer with latch.    Discussed what to expect with breastfeeding in the first 24-48-72 hours following delivery as well as signs of successful  milk transfer.    Discussed the effect of supply and demand on milk production.    MOB stated she is aware that she is not to use Marijuana while breastfeeding and is aware of the risks to infant if she does.    Breastfeeding plan, as noted above, remains unchanged.    MOB verbalized understanding of all information provided to her and denied having any further questions at this time.  Encouraged MOB to call for lactation assistance as needed.

## 2019-10-29 NOTE — PROGRESS NOTES
Discharge instruction for mom and baby discussed. Prescription given and explained. Emphasized the importance of  screening follow-up test. Questions and concerns have been answered. Infant ID band matched with MOB & FOB.

## 2023-11-14 ENCOUNTER — OFFICE VISIT (OUTPATIENT)
Dept: URGENT CARE | Facility: PHYSICIAN GROUP | Age: 33
End: 2023-11-14
Payer: MEDICAID

## 2023-11-14 VITALS
OXYGEN SATURATION: 97 % | WEIGHT: 157 LBS | HEIGHT: 62 IN | RESPIRATION RATE: 18 BRPM | HEART RATE: 74 BPM | DIASTOLIC BLOOD PRESSURE: 64 MMHG | TEMPERATURE: 98.5 F | SYSTOLIC BLOOD PRESSURE: 102 MMHG | BODY MASS INDEX: 28.89 KG/M2

## 2023-11-14 DIAGNOSIS — R07.81 RIB PAIN: ICD-10-CM

## 2023-11-14 PROCEDURE — 99213 OFFICE O/P EST LOW 20 MIN: CPT | Performed by: FAMILY MEDICINE

## 2023-11-14 PROCEDURE — 3074F SYST BP LT 130 MM HG: CPT | Performed by: FAMILY MEDICINE

## 2023-11-14 PROCEDURE — 3078F DIAST BP <80 MM HG: CPT | Performed by: FAMILY MEDICINE

## 2023-11-14 RX ORDER — NAPROXEN 500 MG/1
500 TABLET ORAL 2 TIMES DAILY WITH MEALS
Qty: 60 TABLET | Refills: 0 | Status: SHIPPED | OUTPATIENT
Start: 2023-11-14 | End: 2024-03-25

## 2023-11-14 ASSESSMENT — ENCOUNTER SYMPTOMS
WHEEZING: 0
SHORTNESS OF BREATH: 0
DIARRHEA: 1
SPUTUM PRODUCTION: 0
COUGH: 1
CONSTITUTIONAL NEGATIVE: 1
EYES NEGATIVE: 1
HEMOPTYSIS: 0

## 2023-11-14 NOTE — PROGRESS NOTES
"Subjective:   Toña Tunrer is a 33 y.o. female who presents for Cough, Chest Pain (Chest pain after coughing, 1 time shooting pain in ribs. Non productive /Stress and irregular periods. Poss per menopause), and Diarrhea      Cough  Associated symptoms include chest pain. Pertinent negatives include no hemoptysis, shortness of breath or wheezing.   Chest Pain   Associated symptoms include a cough. Pertinent negatives include no hemoptysis, shortness of breath or sputum production.   Diarrhea   Associated symptoms include coughing.       Review of Systems   Constitutional: Negative.    HENT: Negative.     Eyes: Negative.    Respiratory:  Positive for cough. Negative for hemoptysis, sputum production, shortness of breath and wheezing.    Cardiovascular:  Positive for chest pain.        Left chest wall/ rib   Gastrointestinal:  Positive for diarrhea.   Skin: Negative.        Medications, Allergies, and current problem list reviewed today in Epic.     Objective:     /64   Pulse 74   Temp 36.9 °C (98.5 °F) (Temporal)   Resp 18   Ht 1.575 m (5' 2\")   Wt 71.2 kg (157 lb)   SpO2 97%     Physical Exam  Vitals and nursing note reviewed.   Constitutional:       Appearance: Normal appearance.   HENT:      Head: Normocephalic.      Right Ear: Tympanic membrane normal.      Left Ear: Tympanic membrane normal.      Nose: Congestion present.      Mouth/Throat:      Pharynx: Oropharynx is clear.   Cardiovascular:      Rate and Rhythm: Normal rate and regular rhythm.      Pulses: Normal pulses.      Heart sounds: Normal heart sounds.   Pulmonary:      Effort: Pulmonary effort is normal.      Breath sounds: Normal breath sounds. No wheezing, rhonchi or rales.   Chest:      Chest wall: Tenderness present.   Abdominal:      General: Abdomen is flat. Bowel sounds are normal.      Palpations: Abdomen is soft.   Musculoskeletal:      Cervical back: Normal range of motion and neck supple.   Neurological:      Mental Status: " She is alert.         Assessment/Plan:     Diagnosis and associated orders:     1. Rib pain  naproxen (NAPROSYN) 500 MG Tab         Comments/MDM:              Differential diagnosis, natural history, supportive care, and indications for immediate follow-up discussed.    Advised the patient to follow-up with the primary care physician for recheck, reevaluation, and consideration of further management.    Please note that this dictation was created using voice recognition software. I have made a reasonable attempt to correct obvious errors, but I expect that there are errors of grammar and possibly content that I did not discover before finalizing the note.    This note was electronically signed by Tae Mendoza M.D.

## 2023-12-13 ENCOUNTER — HOSPITAL ENCOUNTER (EMERGENCY)
Facility: MEDICAL CENTER | Age: 33
End: 2023-12-13
Attending: EMERGENCY MEDICINE
Payer: MEDICAID

## 2023-12-13 ENCOUNTER — APPOINTMENT (OUTPATIENT)
Dept: RADIOLOGY | Facility: MEDICAL CENTER | Age: 33
End: 2023-12-13
Attending: EMERGENCY MEDICINE
Payer: MEDICAID

## 2023-12-13 VITALS
OXYGEN SATURATION: 97 % | DIASTOLIC BLOOD PRESSURE: 57 MMHG | TEMPERATURE: 97.4 F | HEART RATE: 93 BPM | SYSTOLIC BLOOD PRESSURE: 116 MMHG | WEIGHT: 156.97 LBS | BODY MASS INDEX: 28.89 KG/M2 | HEIGHT: 62 IN | RESPIRATION RATE: 15 BRPM

## 2023-12-13 DIAGNOSIS — E87.6 HYPOKALEMIA: ICD-10-CM

## 2023-12-13 DIAGNOSIS — E86.0 DEHYDRATION: ICD-10-CM

## 2023-12-13 DIAGNOSIS — K20.90 ESOPHAGITIS: ICD-10-CM

## 2023-12-13 DIAGNOSIS — R19.7 VOMITING AND DIARRHEA: ICD-10-CM

## 2023-12-13 DIAGNOSIS — Z34.90 INTRAUTERINE PREGNANCY: ICD-10-CM

## 2023-12-13 DIAGNOSIS — R11.10 VOMITING AND DIARRHEA: ICD-10-CM

## 2023-12-13 LAB
ALBUMIN SERPL BCP-MCNC: 4.1 G/DL (ref 3.2–4.9)
ALBUMIN/GLOB SERPL: 1.5 G/DL
ALP SERPL-CCNC: 43 U/L (ref 30–99)
ALT SERPL-CCNC: 20 U/L (ref 2–50)
ANION GAP SERPL CALC-SCNC: 15 MMOL/L (ref 7–16)
APPEARANCE UR: CLEAR
AST SERPL-CCNC: 16 U/L (ref 12–45)
B-HCG SERPL-ACNC: ABNORMAL MIU/ML (ref 0–5)
BACTERIA #/AREA URNS HPF: NEGATIVE /HPF
BASOPHILS # BLD AUTO: 0.1 % (ref 0–1.8)
BASOPHILS # BLD: 0.01 K/UL (ref 0–0.12)
BILIRUB SERPL-MCNC: 0.4 MG/DL (ref 0.1–1.5)
BILIRUB UR QL STRIP.AUTO: NEGATIVE
BUN SERPL-MCNC: 10 MG/DL (ref 8–22)
CALCIUM ALBUM COR SERPL-MCNC: 9.3 MG/DL (ref 8.5–10.5)
CALCIUM SERPL-MCNC: 9.4 MG/DL (ref 8.5–10.5)
CHLORIDE SERPL-SCNC: 101 MMOL/L (ref 96–112)
CO2 SERPL-SCNC: 18 MMOL/L (ref 20–33)
COLOR UR: ABNORMAL
CREAT SERPL-MCNC: 0.55 MG/DL (ref 0.5–1.4)
EKG IMPRESSION: NORMAL
EOSINOPHIL # BLD AUTO: 0.03 K/UL (ref 0–0.51)
EOSINOPHIL NFR BLD: 0.4 % (ref 0–6.9)
EPI CELLS #/AREA URNS HPF: NORMAL /HPF
ERYTHROCYTE [DISTWIDTH] IN BLOOD BY AUTOMATED COUNT: 37.9 FL (ref 35.9–50)
GFR SERPLBLD CREATININE-BSD FMLA CKD-EPI: 122 ML/MIN/1.73 M 2
GLOBULIN SER CALC-MCNC: 2.7 G/DL (ref 1.9–3.5)
GLUCOSE SERPL-MCNC: 93 MG/DL (ref 65–99)
GLUCOSE UR STRIP.AUTO-MCNC: NEGATIVE MG/DL
HCT VFR BLD AUTO: 38.2 % (ref 37–47)
HGB BLD-MCNC: 13.4 G/DL (ref 12–16)
HYALINE CASTS #/AREA URNS LPF: NORMAL /LPF
IMM GRANULOCYTES # BLD AUTO: 0.03 K/UL (ref 0–0.11)
IMM GRANULOCYTES NFR BLD AUTO: 0.4 % (ref 0–0.9)
KETONES UR STRIP.AUTO-MCNC: >=160 MG/DL
LEUKOCYTE ESTERASE UR QL STRIP.AUTO: NEGATIVE
LIPASE SERPL-CCNC: 25 U/L (ref 11–82)
LYMPHOCYTES # BLD AUTO: 0.2 K/UL (ref 1–4.8)
LYMPHOCYTES NFR BLD: 3 % (ref 22–41)
MCH RBC QN AUTO: 29.8 PG (ref 27–33)
MCHC RBC AUTO-ENTMCNC: 35.1 G/DL (ref 32.2–35.5)
MCV RBC AUTO: 84.9 FL (ref 81.4–97.8)
MICRO URNS: ABNORMAL
MONOCYTES # BLD AUTO: 0.41 K/UL (ref 0–0.85)
MONOCYTES NFR BLD AUTO: 6.1 % (ref 0–13.4)
MUCOUS THREADS #/AREA URNS HPF: NORMAL /HPF
NEUTROPHILS # BLD AUTO: 6.02 K/UL (ref 1.82–7.42)
NEUTROPHILS NFR BLD: 90 % (ref 44–72)
NITRITE UR QL STRIP.AUTO: NEGATIVE
NRBC # BLD AUTO: 0 K/UL
NRBC BLD-RTO: 0 /100 WBC (ref 0–0.2)
NUMBER OF RH DOSES IND 8505RD: NORMAL
PH UR STRIP.AUTO: 7.5 [PH] (ref 5–8)
PLATELET # BLD AUTO: 145 K/UL (ref 164–446)
PMV BLD AUTO: 11.4 FL (ref 9–12.9)
POTASSIUM SERPL-SCNC: 3.2 MMOL/L (ref 3.6–5.5)
PROT SERPL-MCNC: 6.8 G/DL (ref 6–8.2)
PROT UR QL STRIP: 30 MG/DL
RBC # BLD AUTO: 4.5 M/UL (ref 4.2–5.4)
RBC # URNS HPF: NORMAL /HPF
RBC UR QL AUTO: NEGATIVE
RH BLD: NORMAL
SODIUM SERPL-SCNC: 134 MMOL/L (ref 135–145)
SP GR UR STRIP.AUTO: 1.03
UROBILINOGEN UR STRIP.AUTO-MCNC: 1 MG/DL
WBC # BLD AUTO: 6.7 K/UL (ref 4.8–10.8)
WBC #/AREA URNS HPF: NORMAL /HPF

## 2023-12-13 PROCEDURE — 86901 BLOOD TYPING SEROLOGIC RH(D): CPT

## 2023-12-13 PROCEDURE — 80053 COMPREHEN METABOLIC PANEL: CPT

## 2023-12-13 PROCEDURE — 700111 HCHG RX REV CODE 636 W/ 250 OVERRIDE (IP): Mod: JZ,UD | Performed by: EMERGENCY MEDICINE

## 2023-12-13 PROCEDURE — 84702 CHORIONIC GONADOTROPIN TEST: CPT

## 2023-12-13 PROCEDURE — 83690 ASSAY OF LIPASE: CPT

## 2023-12-13 PROCEDURE — 36415 COLL VENOUS BLD VENIPUNCTURE: CPT

## 2023-12-13 PROCEDURE — 99284 EMERGENCY DEPT VISIT MOD MDM: CPT

## 2023-12-13 PROCEDURE — 700102 HCHG RX REV CODE 250 W/ 637 OVERRIDE(OP): Mod: JZ,UD | Performed by: EMERGENCY MEDICINE

## 2023-12-13 PROCEDURE — 700105 HCHG RX REV CODE 258: Mod: UD | Performed by: EMERGENCY MEDICINE

## 2023-12-13 PROCEDURE — 93005 ELECTROCARDIOGRAM TRACING: CPT

## 2023-12-13 PROCEDURE — 93005 ELECTROCARDIOGRAM TRACING: CPT | Performed by: EMERGENCY MEDICINE

## 2023-12-13 PROCEDURE — A9270 NON-COVERED ITEM OR SERVICE: HCPCS | Mod: JZ,UD | Performed by: EMERGENCY MEDICINE

## 2023-12-13 PROCEDURE — 85025 COMPLETE CBC W/AUTO DIFF WBC: CPT

## 2023-12-13 PROCEDURE — 96374 THER/PROPH/DIAG INJ IV PUSH: CPT

## 2023-12-13 PROCEDURE — 76801 OB US < 14 WKS SINGLE FETUS: CPT

## 2023-12-13 PROCEDURE — 81001 URINALYSIS AUTO W/SCOPE: CPT

## 2023-12-13 RX ORDER — ONDANSETRON 2 MG/ML
8 INJECTION INTRAMUSCULAR; INTRAVENOUS ONCE
Status: COMPLETED | OUTPATIENT
Start: 2023-12-13 | End: 2023-12-13

## 2023-12-13 RX ORDER — SODIUM CHLORIDE 9 MG/ML
1000 INJECTION, SOLUTION INTRAVENOUS ONCE
Status: COMPLETED | OUTPATIENT
Start: 2023-12-13 | End: 2023-12-13

## 2023-12-13 RX ORDER — POTASSIUM CHLORIDE 20 MEQ/1
20 TABLET, EXTENDED RELEASE ORAL 2 TIMES DAILY
Qty: 10 TABLET | Refills: 11 | Status: SHIPPED | OUTPATIENT
Start: 2023-12-13 | End: 2024-03-25

## 2023-12-13 RX ORDER — ONDANSETRON 8 MG/1
8 TABLET, ORALLY DISINTEGRATING ORAL EVERY 8 HOURS PRN
Qty: 15 TABLET | Refills: 0 | Status: SHIPPED | OUTPATIENT
Start: 2023-12-13 | End: 2024-03-25

## 2023-12-13 RX ORDER — POTASSIUM CHLORIDE 20 MEQ/1
40 TABLET, EXTENDED RELEASE ORAL ONCE
Status: COMPLETED | OUTPATIENT
Start: 2023-12-13 | End: 2023-12-13

## 2023-12-13 RX ADMIN — ONDANSETRON 8 MG: 2 INJECTION INTRAMUSCULAR; INTRAVENOUS at 15:04

## 2023-12-13 RX ADMIN — SODIUM CHLORIDE 1000 ML: 9 INJECTION, SOLUTION INTRAVENOUS at 16:39

## 2023-12-13 RX ADMIN — SODIUM CHLORIDE 1000 ML: 9 INJECTION, SOLUTION INTRAVENOUS at 15:05

## 2023-12-13 RX ADMIN — POTASSIUM CHLORIDE 40 MEQ: 1500 TABLET, EXTENDED RELEASE ORAL at 17:43

## 2023-12-13 RX ADMIN — LIDOCAINE HYDROCHLORIDE 15 ML: 20 SOLUTION OROPHARYNGEAL at 16:35

## 2023-12-13 NOTE — ED PROVIDER NOTES
ER Provider Note    Scribed for Ajit Millard D.O. by Marnie Finnegan. 12/13/2023  2:35 PM    Primary Care Provider: Pcp Pt States None    CHIEF COMPLAINT  Chief Complaint   Patient presents with    Shortness of Breath     Pt states she started to fell sob 8 hrs ago, along with n/v, pt states anything she eats or drinks she vomits right after, pt is 10 weeks pregnant, pt states every time she cough it feels like she pulled a muscle       HPI/ROS    Toña Turner is a 33 y.o. female who presents to the Emergency Department for nausea and vomiting onset 2 days ago. The patient explains that she is 10 weeks pregnant. She reports that she began to feel sick two days ago and had diarrhea that progressed to vomiting last night. Since last night she has been unable to keep any fluids or food down. She has associated subjective fever, cough, chest pain, and chills. The patient describes her chest pain as a tearing feeling when she coughs and a heavy feeling constantly. The patient denies any previous medical history or abdominal surgeries. There are no known alleviating or exacerbating factors. She is allergic to amoxicillin and codeine.     ROS as per HPI.    PAST MEDICAL HISTORY  Past Medical History:   Diagnosis Date    Pregnancy     Seizure (HCC)        SURGICAL HISTORY  Past Surgical History:   Procedure Laterality Date    OTHER      tonselectomy    OTHER ABDOMINAL SURGERY      appendectomy       FAMILY HISTORY  History reviewed. No pertinent family history.    SOCIAL HISTORY   reports that she quit smoking about 11 years ago. Her smoking use included cigarettes. She has never used smokeless tobacco. She reports current alcohol use. She reports current drug use. Drug: Inhaled.    CURRENT MEDICATIONS  Current Outpatient Medications   Medication Instructions    docusate sodium 100 mg, Oral, 2 TIMES DAILY PRN    ibuprofen (MOTRIN) 800 mg, Oral, EVERY 6 HOURS PRN    ibuprofen (MOTRIN) 800 mg, Oral, EVERY 8 HOURS PRN  "   naproxen (NAPROSYN) 500 mg, Oral, 2 TIMES DAILY WITH MEALS    prenatal plus vitamin (STUARTNATAL 1+1) 27-1 MG Tab tablet 1 Tablet, Oral, EVERY MORNING     ALLERGIES  Amoxicillin, Codeine, Latex, Pcn [penicillins], and Vicodin [hydrocodone-acetaminophen]    PHYSICAL EXAM  /63   Pulse 90   Temp 36.1 °C (96.9 °F) (Temporal)   Resp 16   Ht 1.575 m (5' 2\")   Wt 71.2 kg (156 lb 15.5 oz)   LMP 10/08/2023 (Exact Date)   SpO2 96%   BMI 28.71 kg/m²     General: Mild distress.  HENT: Normocephalic, Mucus membranes are moist.   Chest: Lungs have even and unlabored respirations, Clear to auscultation.   Cardiovascular: Regular rate and regular rhythm, No peripheral cyanosis.  Abdomen: Non distended.  Neuro: Awake, Conversive, Able to relay recent events.  Psychiatric: Calm and cooperative.     INITIAL ASSESSMENT  Patient has known pregnancy. She presents to the ED for complaints of vomiting and diarrhea, she is unable to hold anything PO. She has a mild cough with chest discomfort. Her pulse oxygen is normal, there is no tachycardia. Will give IV fluids for dehydration. Will test for electrolyte imbalance and obtain US for pelvic pain with pregnancy.     ED Observation Status? No; Patient does not meet criteria for ED Observation.     DIAGNOSTIC STUDIES    Labs:   Results for orders placed or performed during the hospital encounter of 12/13/23   CBC WITH DIFFERENTIAL   Result Value Ref Range    WBC 6.7 4.8 - 10.8 K/uL    RBC 4.50 4.20 - 5.40 M/uL    Hemoglobin 13.4 12.0 - 16.0 g/dL    Hematocrit 38.2 37.0 - 47.0 %    MCV 84.9 81.4 - 97.8 fL    MCH 29.8 27.0 - 33.0 pg    MCHC 35.1 32.2 - 35.5 g/dL    RDW 37.9 35.9 - 50.0 fL    Platelet Count 145 (L) 164 - 446 K/uL    MPV 11.4 9.0 - 12.9 fL    Neutrophils-Polys 90.00 (H) 44.00 - 72.00 %    Lymphocytes 3.00 (L) 22.00 - 41.00 %    Monocytes 6.10 0.00 - 13.40 %    Eosinophils 0.40 0.00 - 6.90 %    Basophils 0.10 0.00 - 1.80 %    Immature Granulocytes 0.40 0.00 - 0.90 " %    Nucleated RBC 0.00 0.00 - 0.20 /100 WBC    Neutrophils (Absolute) 6.02 1.82 - 7.42 K/uL    Lymphs (Absolute) 0.20 (L) 1.00 - 4.80 K/uL    Monos (Absolute) 0.41 0.00 - 0.85 K/uL    Eos (Absolute) 0.03 0.00 - 0.51 K/uL    Baso (Absolute) 0.01 0.00 - 0.12 K/uL    Immature Granulocytes (abs) 0.03 0.00 - 0.11 K/uL    NRBC (Absolute) 0.00 K/uL   COMP METABOLIC PANEL   Result Value Ref Range    Sodium 134 (L) 135 - 145 mmol/L    Potassium 3.2 (L) 3.6 - 5.5 mmol/L    Chloride 101 96 - 112 mmol/L    Co2 18 (L) 20 - 33 mmol/L    Anion Gap 15.0 7.0 - 16.0    Glucose 93 65 - 99 mg/dL    Bun 10 8 - 22 mg/dL    Creatinine 0.55 0.50 - 1.40 mg/dL    Calcium 9.4 8.5 - 10.5 mg/dL    Correct Calcium 9.3 8.5 - 10.5 mg/dL    AST(SGOT) 16 12 - 45 U/L    ALT(SGPT) 20 2 - 50 U/L    Alkaline Phosphatase 43 30 - 99 U/L    Total Bilirubin 0.4 0.1 - 1.5 mg/dL    Albumin 4.1 3.2 - 4.9 g/dL    Total Protein 6.8 6.0 - 8.2 g/dL    Globulin 2.7 1.9 - 3.5 g/dL    A-G Ratio 1.5 g/dL   LIPASE   Result Value Ref Range    Lipase 25 11 - 82 U/L   HCG QUANTITATIVE   Result Value Ref Range    [facility] [place], [place] [address].0 (H) 0.0 - 5.0 mIU/mL   URINALYSIS,CULTURE IF INDICATED    Specimen: Urine   Result Value Ref Range    Color DK Yellow     Character Clear     Specific Gravity 1.034 <1.035    Ph 7.5 5.0 - 8.0    Glucose Negative Negative mg/dL    Ketones >=160 Negative mg/dL    Protein 30 (A) Negative mg/dL    Bilirubin Negative Negative    Urobilinogen, Urine 1.0 Negative    Nitrite Negative Negative    Leukocyte Esterase Negative Negative    Occult Blood Negative Negative    Micro Urine Req Microscopic    RH Type for Rhogam from E.D.   Result Value Ref Range    Emergency Department Rh Typing POS     Number Of Rh Doses Indicated ZERO    ESTIMATED GFR   Result Value Ref Range    GFR (CKD-EPI) 122 >60 mL/min/1.73 m 2   URINE MICROSCOPIC (W/UA)   Result Value Ref Range    WBC 0-2 /hpf    RBC 0-2 /hpf    Bacteria Negative None /hpf    Epithelial Cells Few /hpf    Mucous  Threads Few /hpf    Hyaline Cast 0-2 /lpf   EKG   Result Value Ref Range    Report       [facility] Emergency [facility].    Test Date:  2023  Pt Name:    RANDA TUCKER                  Department: ER  MRN:        [id number]                      Room:  Gender:     Female                       Technician: [id number]  :        1990                   Requested By:ER TRIAGE PROTOCOL  Order #:    [id number]                    Reading MD: ANJELICA GUILLORY D.O.    Measurements  Intervals                                Axis  Rate:       83                           P:          44  MA:         150                          QRS:        41  QRSD:       95                           T:          26  QT:         357  QTc:        420    Interpretive Statements  Sinus rhythm  No previous ECG available for comparison  Electronically Signed On 2023 17:33:20 PST by ANJELICA GUILLORY D.O.        EKG:   I have independently interpreted the above EKG.    Radiology:   The attending emergency physician has independently interpreted the diagnostic imaging associated with this visit and am waiting the final reading from the radiologist.   Preliminary interpretation is as follows: IUP  Radiologist interpretation:   US-OB 1ST TRIMESTER WITH TRANSVAGINAL (COMBO)   Final Result      1.  Single living intrauterine pregnancy at 8 weeks, 3 days estimated gestational age.           COURSE & MEDICAL DECISION MAKING     COURSE AND PLAN  2:35 PM - Patient seen and examined at bedside for vomiting and diarrhea. Discussed plan of care, including labs and imaging. Patient agrees to the plan of care. The patient will be resuscitated with 1L NS IV and medicated with 8 mg Zofran and GI cocktail. Ordered for US 1st trimester with transvaginal, CBC with diff, CMP, lipase, HCG Quant, UA, RH type, and EKG to evaluate her symptoms.     4:36 PM - Patient was reevaluated at bedside. Chest pain is resolved after GI cocktail has not received  fluids yet.    5:37 PM - Patient reevaluated at bedside. She feels improved following fluid administration. Discussed plan for discharge, including plan for follow-up, and informed them to return to the [facility] ED with any new or worsening symptoms. Patient was given the opportunity for questions, and I addressed all questions or concerns. She is stable for discharge at this time. Patient verbalizes understanding and support with my plan for discharge.    ED Summary: Patient presents with vomiting, diarrhea that started last night.  Her last episode of diarrhea was several hours ago.  She did vomit while in the emergency department.  She is known to be pregnant, she has had no prenatal evaluation for this.  She complains of chest tightness.    She is not hypoxic not tachycardic not tachypneic and do not suspect pulmonary embolism.  She was given a GI cocktail which resolved her pain I suspect this to be related to esophagitis from the vomiting.    Her lab test shows volume contraction with a CO2 of 18.  She is not acidotic and she was given IV fluids.  Her potassium is mildly increased at 3.2, she was given 40 mill equivalents of p.o. potassium and tolerated this well.    After antiemetics and treatment the patient feels improved.  She is stable for discharge home she will be placed on antinausea medications, and potassium.  She is to follow-up with a OB doctor and return if her symptoms change or worsen.    Decision tools and prescription drugs considered including, but not limited to: Zofran and GI cocktail for vomiting.    HYDRATION: Based on the patient's presentation of Dehydration the patient was given IV fluids. IV Hydration was used because oral hydration was not adequate alone. Upon recheck following hydration, the patient was feeling improved.      DISPOSITION AND DISCUSSIONS  I have discussed management of the patient with the following physicians and YEHUDA's: None    Discussion of management with other Hospitals in [place]  or appropriate source(s): None    Barriers to care at this time, including but not limited to: No PCP     The patient will return for new or worsening symptoms and is stable at the time of discharge.    The patient is referred to a primary physician for blood pressure management, diabetic screening, and for all other preventative health concerns.    DISPOSITION:  Patient will be discharged home in stable condition.    FOLLOW UP:  [facility]  [address]  [facility] [address]  [phone]  In 1 week        OUTPATIENT MEDICATIONS:  Discharge Medication List as of 12/13/2023  5:55 PM        START taking these medications    Details   potassium chloride SA (KDUR) 20 MEQ Tab CR Take 1 Tablet by mouth 2 times a day., Disp-10 Tablet, R-11, Normal      ondansetron (ZOFRAN ODT) 8 MG TABLET DISPERSIBLE Take 1 Tablet by mouth every 8 hours as needed for Nausea., Disp-15 Tablet, R-0, Normal              FINAL DIAGNOSIS  1. Vomiting and diarrhea    2. Esophagitis    3. Dehydration    4. Hypokalemia    5. Intrauterine pregnancy        Marnie JAMES (Sen), am scribing for, and in the presence of, Ajit Millard D.O..    Electronically signed by: Marnie Finnegan (Margeibelza), 12/13/2023    Ajit JAMES D.O. personally performed the services described in this documentation, as scribed by Marnie Finnegan in my presence, and it is both accurate and complete.     The note accurately reflects work and decisions made by me.  Ajit Millard D.O.  12/13/2023  6:39 PM

## 2023-12-13 NOTE — ED TRIAGE NOTES
"Chief Complaint   Patient presents with    Shortness of Breath     Pt states she started to fell sob 8 hrs ago, along with n/v, pt states anything she eats or drinks she vomits right after, pt is 10 weeks pregnant, pt states every time she cough it feels like she pulled a muscle     /63   Pulse 90   Temp 36.1 °C (96.9 °F) (Temporal)   Resp 16   Ht 1.575 m (5' 2\")   Wt 71.2 kg (156 lb 15.5 oz)   LMP 10/08/2023 (Exact Date)   SpO2 96%   BMI 28.71 kg/m²     "

## 2023-12-13 NOTE — ED NOTES
8/7/2018       RE: Jarrett Brown  [address]  [facility] [address]     Dear Colleague,    Thank you for referring your patient, Jarrett Brown, to the [facility] DERMATOLOGY at [facility]. Please see a copy of my visit note below.    [facility] Dermatology Note      Dermatology Problem List:  1. Hx of Melanoma: T1a, L upper back. S/p WLE 1999. NERD  2. 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 Actinic chelitis  4. AKs: LN2  5. Scalp folliculitis: Keto shampoo, clinda lotion PRN  6. Wart, right ring finger: Paring, LN2, sal acid/duct tape, Efudex QOD, cantharone, zinc sulfate 200mg BID  7. Lesion to monitor, Right upper lip. See photo 9/21/2017.  Mild telangectasia 10/19/2017. No substance or lesion appreciated.     CC:   Chief Complaint   Patient presents with     Skin Check     Jarrett is here today for a skin check- has some areas of concern.        Encounter Date: Aug 7, 2018    History of Present Illness:  Mr. Jarrett Brown is a 71 year old male who returns regarding a wart on his right 4th finger. He was last seen 7/24/18 for cryo therapy and cantharone on his right 4th digit. Today the patient reports that it is almost resolved. He notes that there is a small ridge in his finger nail but it is not painful.  He has been paring it himself and using efudex cream at home. He has been taking oral zinc sulfate 220mg BID since February 2018    He has a hx of melanoma, his last skin check was in August 2017. The patient denies painful, itching, tingling or bleeding lesions unless otherwise noted. He does have one spot o concern on his posterior neck that catches his shirts. His wife is present and is also concerned about a brown spot behind his left ear. He also has a hx of SCC - lower lip and AKs in the past. He is feeling well today.     Past Medical History:   Patient Active Problem List   Diagnosis     S/P TKR (total knee replacement)     Spermatocele     SCC  Pt to Y57. Pt is A&Ox4 and awake in bed. Pt placed on cardiac monitor, pulse ox, and automatic BP. VSS, saturating above 95% on RA, SR in the 70s. Call light within reach and chart up for ERP.     (squamous cell carcinoma), face     Preventative health care     Bacterial folliculitis     Essential hypertension with goal blood pressure less than 140/90     Elevated serum glucose     Elevated LDL cholesterol level     Unstable angina (H)     Coronary artery disease involving native coronary artery of native heart     Benign prostatic hyperplasia with lower urinary tract symptoms, unspecified morphology     Past Medical History:   Diagnosis Date     BPH (benign prostatic hyperplasia)      Cataract      Chest pain 11/29/2016     Coronary artery disease involving native coronary artery of native heart 12/17/2016     Glaucoma     angle-closure / PXF     Hominy's disease      Malignant melanoma (H)      Malignant melanoma nos      Osteoarthritis      Squamous cell carcinoma 2014    lip, MOHS procedure     Past Surgical History:   Procedure Laterality Date     ARTHROPLASTY KNEE  3/24/2011    ARTHROPLASTY KNEE performed by UCHE WHITEHEAD at [facility]     ARTHROPLASTY REVISION KNEE      right     ARTHROSCOPY KNEE      left     ARTHROSCOPY SHOULDER      left     BIOPSY OF SKIN LESION       CATARACT IOL, RT/LT  5/8/12 & 5/29/12    LE / RE     HERNIORRHAPHY INGUINAL      right     HYDROCELECTOMY INGUINAL       LASER IRIDOTOMY OD (RIGHT EYE)  6/4/2009    RE LPI     LASER IRIDOTOMY OS (LEFT EYE)   2/25/09    LE LPI     LASER SELECTIVE TRABECULOPLASTY       MOHS MICROGRAPHIC PROCEDURE       NO HISTORY OF SURGERY  9/27/13    derm       Social History:  The patient is retired. Worked for many years in the [employer] program (through 2012). Did spend time in [place].     Family History:  Not obtained today.      Medications:  Current Outpatient Prescriptions   Medication Sig Dispense Refill     acetaminophen (TYLENOL) 325 MG tablet Take 2 tablets (650 mg) by mouth every 4 hours as needed for other (mild pain) 100 tablet 0     albuterol (PROAIR HFA, PROVENTIL HFA, VENTOLIN HFA) 108 (90 BASE) MCG/ACT inhaler Inhale 2  puffs into the lungs every 6 hours as needed for shortness of breath / dyspnea or wheezing 1 Inhaler 1     ascorbic acid (VITAMIN C) 500 MG tablet Take 500 mg by mouth every morning        aspirin 81 MG tablet Take 81 mg by mouth At Bedtime        atorvastatin (LIPITOR) 80 MG tablet Take 1 tablet (80 mg) by mouth every evening 90 tablet 3     cetirizine (ZYRTEC) 10 MG tablet Take 10 mg by mouth At Bedtime        diphenhydrAMINE (BENADRYL) 25 MG capsule Take 50 mg by mouth as needed        fluorouracil (EFUDEX) 5 % cream Apply to wart nightly. 40 g 1     lisinopril (PRINIVIL/ZESTRIL) 5 MG tablet Take 1 tablet (5 mg) by mouth daily Needs follow-up and labs for refills. 30 tablet 0     Multiple Vitamins-Minerals (CENTRUM SILVER) per tablet Take 1 tablet by mouth every morning        Multiple Vitamins-Minerals (PRESERVISION AREDS 2) CAPS Take by mouth every morning       Multiple Vitamins-Minerals (ZINC PO) Take 220 mg by mouth 2 times daily       Omega-3 Fatty Acids (OMEGA-3 FISH OIL PO) Take by mouth At Bedtime        oxybutynin (DITROPAN XL) 10 MG 24 hr tablet Take 1 tablet (10 mg) by mouth daily 90 tablet 3     tamsulosin (FLOMAX) 0.4 MG capsule Take 2 capsules (0.8 mg) by mouth daily at night 180 capsule 4     ticagrelor (BRILINTA) 90 MG tablet Take 1 tablet (90 mg) by mouth every 12 hours 180 tablet 3     metoprolol (LOPRESSOR) 25 MG tablet Take 0.5 tablets (12.5 mg) by mouth 2 times daily (Patient not taking: Reported on 7/31/2018) 90 tablet 3     Allergies   Allergen Reactions     Pollen Extract Other (See Comments)     Sulfa Drugs Hives         Review of Systems:  -Constitutional: The patient is feeling generally well  -Skin: As above in HPI. No additional skin concerns.  -Heme/Lymph: no concerning bumps, no bleeding or bruising problems     Physical exam:  Vitals: There were no vitals taken for this visit.  GEN: This is a well developed, well-nourished male in no acute distress, in a pleasant mood.    SKIN:  Focused examination of the face and bilateral hands and digits was performed.  LYMPH: Examination of the pre/post auricular, occipital, cervical, clavicular, and axillary lymph nodes was negative.  -Skin colored verrucous papule on the right distal ring finger, improved since last visit  -3mm pink papule on the left central chest  -waxy stuck on papules on the neck and trunk  -pink scaled slightly hyperkeratotic papule and macules x 5 - posterior neck and left forearm  -No other lesions of concern on areas examined.     Impression/Plan:  1. Neoplasm of Uncertain behavior    After discussion of benefits and risks including but not limited to bleeding, infection, scar, incomplete removal, recurrence, and non-diagnostic biopsy, written consent and photographs were obtained. The area was cleaned with isopropyl alcohol. 1mL of 1% lidocaine with epinephrine was injected to obtain adequate anesthesia of the lesion on the left chest. A shave biopsy was performed. Hemostasis was achieved with aluminium chloride. Vaseline and a sterile dressing were applied. The patient tolerated the procedure and no complications were noted. The patient was provided with verbal and written post care instructions.     2. Hx of melanoma - T1a, L upper back. S/p WLE 1999. NERD    Continue with annual skin exams    Sunscreen: Apply 20 minutes prior to going outdoors and reapply every two hours, when wet or sweating. We recommend using an SPF 30 or higher, and to use one that is water resistant.       3. AK x 5 - left forearm x4, posterior neck x1    Cryotherapy procedure note: After verbal consent and discussion of risks and benefits including but no limited to dyspigmentation/scar, blister, and pain, 5 was(were) treated with 1-2mm freeze border for 2 cycles with liquid nitrogen. Post cryotherapy instructions were provided.     4. SK - scattered on the trunk     Reassured benign    5. Wart - right 4th digit - has had about 1 year  Cryotherapy  procedure note: After verbal consent and discussion of risks and benefits including but no limited to dyspigmentation/scar, blister, and pain, 1 was(were) treated with 1-2 mm freeze border for 2 cycles with liquid nitrogen. Post cryotherapy instructions were provided.   Continue alternating Efudex 5% cream with OTC wart stick (40% sal acid) nightly as tolerated    Follow-up in 3 weeks, earlier for new or changing lesions.  CC Dr. Jacob at close of this encounter.     Staff Involved:    Isabella Strauss PA-C  [facility]: Phone: [phone], Fax: [phone]

## 2023-12-14 NOTE — DISCHARGE INSTRUCTIONS
Clear liquids only for the next 24 hours, advance diet as tolerated.  Use nausea medication as prescribed.    Your potassium level was mildly low, you are being prescribed a potassium pill to take twice a day for the next 5 days.  Start this tomorrow.    A referral has been placed to the women's health or you can call for a local provider in [place].    Return for any change or worsening symptoms.

## 2024-01-20 ENCOUNTER — APPOINTMENT (OUTPATIENT)
Dept: URGENT CARE | Facility: PHYSICIAN GROUP | Age: 34
End: 2024-01-20

## 2024-03-18 ENCOUNTER — APPOINTMENT (OUTPATIENT)
Dept: OBGYN | Facility: CLINIC | Age: 34
End: 2024-03-18
Payer: MEDICAID

## 2024-03-25 ENCOUNTER — APPOINTMENT (OUTPATIENT)
Dept: OBGYN | Facility: CLINIC | Age: 34
End: 2024-03-25
Payer: MEDICAID

## 2024-03-25 ENCOUNTER — HOSPITAL ENCOUNTER (OUTPATIENT)
Facility: MEDICAL CENTER | Age: 34
End: 2024-03-25
Attending: NURSE PRACTITIONER
Payer: MEDICAID

## 2024-03-25 ENCOUNTER — INITIAL PRENATAL (OUTPATIENT)
Dept: OBGYN | Facility: CLINIC | Age: 34
End: 2024-03-25
Payer: MEDICAID

## 2024-03-25 VITALS — DIASTOLIC BLOOD PRESSURE: 74 MMHG | BODY MASS INDEX: 31.09 KG/M2 | SYSTOLIC BLOOD PRESSURE: 104 MMHG | WEIGHT: 170 LBS

## 2024-03-25 DIAGNOSIS — Z87.59 HISTORY OF POSTPARTUM HEMORRHAGE: ICD-10-CM

## 2024-03-25 DIAGNOSIS — O09.30 LATE PRENATAL CARE: ICD-10-CM

## 2024-03-25 DIAGNOSIS — F12.90 MARIJUANA USE: ICD-10-CM

## 2024-03-25 DIAGNOSIS — Z64.1 GRAND MULTIPARA: ICD-10-CM

## 2024-03-25 DIAGNOSIS — Z87.898 HISTORY OF SEIZURES: ICD-10-CM

## 2024-03-25 DIAGNOSIS — O09.529 ANTEPARTUM MULTIGRAVIDA OF ADVANCED MATERNAL AGE: ICD-10-CM

## 2024-03-25 LAB
ABO GROUP BLD: NORMAL
BLD GP AB SCN SERPL QL: NORMAL
ERYTHROCYTE [DISTWIDTH] IN BLOOD BY AUTOMATED COUNT: 45.7 FL (ref 35.9–50)
HBV SURFACE AG SER QL: NORMAL
HCT VFR BLD AUTO: 38.6 % (ref 37–47)
HCV AB SER QL: NORMAL
HGB BLD-MCNC: 13 G/DL (ref 12–16)
HIV 1+2 AB+HIV1 P24 AG SERPL QL IA: NORMAL
MCH RBC QN AUTO: 30.3 PG (ref 27–33)
MCHC RBC AUTO-ENTMCNC: 33.7 G/DL (ref 32.2–35.5)
MCV RBC AUTO: 90 FL (ref 81.4–97.8)
PLATELET # BLD AUTO: 183 K/UL (ref 164–446)
PMV BLD AUTO: 10.8 FL (ref 9–12.9)
RBC # BLD AUTO: 4.29 M/UL (ref 4.2–5.4)
RH BLD: NORMAL
RUBV AB SER QL: 25.4 IU/ML
T PALLIDUM AB SER QL IA: NORMAL
WBC # BLD AUTO: 8.8 K/UL (ref 4.8–10.8)

## 2024-03-25 PROCEDURE — 86850 RBC ANTIBODY SCREEN: CPT

## 2024-03-25 PROCEDURE — 87480 CANDIDA DNA DIR PROBE: CPT

## 2024-03-25 PROCEDURE — 36415 COLL VENOUS BLD VENIPUNCTURE: CPT

## 2024-03-25 PROCEDURE — 88175 CYTOPATH C/V AUTO FLUID REDO: CPT

## 2024-03-25 PROCEDURE — 87491 CHLMYD TRACH DNA AMP PROBE: CPT

## 2024-03-25 PROCEDURE — 87510 GARDNER VAG DNA DIR PROBE: CPT

## 2024-03-25 PROCEDURE — 87591 N.GONORRHOEAE DNA AMP PROB: CPT

## 2024-03-25 PROCEDURE — 87660 TRICHOMONAS VAGIN DIR PROBE: CPT

## 2024-03-25 PROCEDURE — 86901 BLOOD TYPING SEROLOGIC RH(D): CPT

## 2024-03-25 PROCEDURE — 87389 HIV-1 AG W/HIV-1&-2 AB AG IA: CPT

## 2024-03-25 PROCEDURE — 0500F INITIAL PRENATAL CARE VISIT: CPT | Performed by: NURSE PRACTITIONER

## 2024-03-25 PROCEDURE — 87086 URINE CULTURE/COLONY COUNT: CPT

## 2024-03-25 PROCEDURE — 86780 TREPONEMA PALLIDUM: CPT

## 2024-03-25 PROCEDURE — 80307 DRUG TEST PRSMV CHEM ANLYZR: CPT

## 2024-03-25 PROCEDURE — 3078F DIAST BP <80 MM HG: CPT | Performed by: NURSE PRACTITIONER

## 2024-03-25 PROCEDURE — 87340 HEPATITIS B SURFACE AG IA: CPT

## 2024-03-25 PROCEDURE — 86900 BLOOD TYPING SEROLOGIC ABO: CPT

## 2024-03-25 PROCEDURE — 86762 RUBELLA ANTIBODY: CPT

## 2024-03-25 PROCEDURE — 3074F SYST BP LT 130 MM HG: CPT | Performed by: NURSE PRACTITIONER

## 2024-03-25 PROCEDURE — 87624 HPV HI-RISK TYP POOLED RSLT: CPT

## 2024-03-25 PROCEDURE — 86803 HEPATITIS C AB TEST: CPT

## 2024-03-25 PROCEDURE — 85027 COMPLETE CBC AUTOMATED: CPT

## 2024-03-25 RX ORDER — ONDANSETRON 4 MG/1
4 TABLET, ORALLY DISINTEGRATING ORAL EVERY 6 HOURS PRN
Qty: 60 TABLET | Refills: 4 | Status: SHIPPED | OUTPATIENT
Start: 2024-03-25

## 2024-03-25 RX ORDER — METOCLOPRAMIDE 10 MG/1
10 TABLET ORAL 4 TIMES DAILY
Qty: 60 TABLET | Refills: 3 | Status: SHIPPED | OUTPATIENT
Start: 2024-03-25

## 2024-03-25 RX ORDER — PROMETHAZINE HYDROCHLORIDE 25 MG/1
25 TABLET ORAL EVERY 6 HOURS PRN
Qty: 60 TABLET | Refills: 3 | Status: SHIPPED | OUTPATIENT
Start: 2024-03-25

## 2024-03-25 ASSESSMENT — EDINBURGH POSTNATAL DEPRESSION SCALE (EPDS)
I HAVE FELT SAD OR MISERABLE: NO, NOT AT ALL
TOTAL SCORE: 1
I HAVE BEEN SO UNHAPPY THAT I HAVE BEEN CRYING: NO, NEVER
I HAVE BEEN ABLE TO LAUGH AND SEE THE FUNNY SIDE OF THINGS: AS MUCH AS I ALWAYS COULD
I HAVE BLAMED MYSELF UNNECESSARILY WHEN THINGS WENT WRONG: NO, NEVER
I HAVE BEEN SO UNHAPPY THAT I HAVE HAD DIFFICULTY SLEEPING: NOT AT ALL
THINGS HAVE BEEN GETTING ON TOP OF ME: NO, MOST OF THE TIME I HAVE COPED QUITE WELL
THE THOUGHT OF HARMING MYSELF HAS OCCURRED TO ME: NEVER
I HAVE BEEN ANXIOUS OR WORRIED FOR NO GOOD REASON: NO, NOT AT ALL
I HAVE LOOKED FORWARD WITH ENJOYMENT TO THINGS: AS MUCH AS I EVER DID
I HAVE FELT SCARED OR PANICKY FOR NO GOOD REASON: NO, NOT AT ALL

## 2024-03-25 ASSESSMENT — FIBROSIS 4 INDEX: FIB4 SCORE: 0.86

## 2024-03-25 NOTE — PROGRESS NOTES
Subjective:   Toña Turner is a 33 y.o.  who presents for her new OB exam.  She is 23w1d with an SNEHAL of Estimated Date of Delivery: 24 by US due to 1 week difference with US . She is feeling well but has lots of N/V. Denies VB, LOF, contractions or pain. No ER visits or previous care in this pregnancy. Denies dysuria, vaginal DC, fever. Reports good fetal movement. Declines AFP.  Declines CF.  Declines NIPT testing.     Past Medical History:   Diagnosis Date    Seizure (HCC) 2006   Reports hx of mild seizures, most common in pregnancy or postpartum, reports fam hx of epilepsy.     Psych Hx: Patient denies any history of depression, anxiety, PTSD, bipolar or any other psychological issues.     EPDS completed: 1    Past Surgical History:   Procedure Laterality Date    OTHER ABDOMINAL SURGERY      appendectomy    OTHER      tonselectomy        See Ob Hx    Gynecological Hx: Reports hx of chlamydia treated. Denies any hx of STIs, including HSV. Denies any vulvovaginal disorders and no hx of abnormal cervical cytology. Last pap 2019, WNL.     Sexual Hx: One current male partner, who is FOB, same from last pregnancy.     Contraceptive Hx: Has used IUD in the past and has since discontinued use.     Family History   Problem Relation Age of Onset    Cancer Mother         Lieukemia    Diabetes Mother     No Known Problems Father      Denies any genetic disorders in family history.     Social History     Socioeconomic History    Marital status: Single     Spouse name: Not on file    Number of children: Not on file    Years of education: Not on file    Highest education level: Not on file   Occupational History    Not on file   Tobacco Use    Smoking status: Former     Current packs/day: 0.00     Types: Cigarettes     Quit date: 2012     Years since quittin.9    Smokeless tobacco: Never   Vaping Use    Vaping Use: Never used   Substance and Sexual Activity    Alcohol use: Not Currently      Comment: Rare Occ    Drug use: Yes     Types: Marijuana     Comment: marijuana edibles    Sexual activity: Yes     Partners: Male     Birth control/protection: None, Condom   Other Topics Concern    Not on file   Social History Narrative    Not on file     Social Determinants of Health     Financial Resource Strain: Not on file   Food Insecurity: Not on file   Transportation Needs: Not on file   Physical Activity: Not on file   Stress: Not on file   Social Connections: Not on file   Intimate Partner Violence: Not on file   Housing Stability: Not on file       FOB is involved and lives with Toña Turner.  Pregnancy is planned.    She is currently not working, denies any heavy lifting or exposure to potential teratogens like environmental or occupational toxins.   Denies alcohol use, drug use, or tobacco use in pregnancy.   Denies any current or hx of sexual, emotional or physical abuse or trauma.     Current Medications: PNV   Allergies: see allergy list     Objective:      Vitals:    03/25/24 0951   BP: 104/74   Weight: 170 lb        See Prenatal Physical and Prenatal Vitals      Assessment:      1.  IUP @ 23w1d per US      2.  S=D      3.  See problem list as follows       Patient Active Problem List    Diagnosis Date Noted    Advanced maternal age in multigravida 03/25/2024    Late prenatal care @ 24 weeks 03/25/2024         Plan:   - GC/CT/vaginal pathogens and pap collected   - Encouraged to get flu vacc in local pharmacy as none in clinic now  - Declines genetic testing   - Marijuana cessation reviewed, will trial other anti-nausea pills and see which ones are covered by insurance   - Prenatal labs ordered - lab slip given  - Discussed PNV, nutrition, adequate water intake, and exercise/weight gain in pregnancy  - NOB informational packet with anticipatory guidance given  - Information on Centering Pregnancy given, n/a  - S/sx of pregnancy warning signs and PTL precautions given  - Complete OB US in  asap   wks  - Return to RW in 4 wks    Nanci Morales

## 2024-03-25 NOTE — PROGRESS NOTES
"Pt here for NOB apt.   Last pap : [facility] 2019 - WNL   LMP = 10/8/24  SNEHAL = 7/14/24 by LMP   US : 12/13/23 @8W3D  GA today = 24W1D  EPDS = 1  Pt is not interested in genetic testing.   Pt states yesterday she got really sick vomiting - states she has been super sick this whole pregnancy. Felt like she \"tore\"/pulled a muscle.   Phone/Pharm verified.     "

## 2024-03-26 LAB
CANDIDA DNA VAG QL PROBE+SIG AMP: NEGATIVE
G VAGINALIS DNA VAG QL PROBE+SIG AMP: NEGATIVE
T VAGINALIS DNA VAG QL PROBE+SIG AMP: NEGATIVE

## 2024-03-27 LAB
AMPHET CTO UR CFM-MCNC: NEGATIVE NG/ML
BACTERIA UR CULT: NORMAL
BARBITURATES CTO UR CFM-MCNC: NEGATIVE NG/ML
BENZODIAZ CTO UR CFM-MCNC: NEGATIVE NG/ML
CANNABINOIDS CTO UR CFM-MCNC: NORMAL NG/ML
COCAINE CTO UR CFM-MCNC: NEGATIVE NG/ML
CREAT UR-MCNC: 121.5 MG/DL (ref 20–400)
DRUG COMMENT 753798: NORMAL
METHADONE CTO UR CFM-MCNC: NEGATIVE NG/ML
OPIATES CTO UR CFM-MCNC: NEGATIVE NG/ML
PCP CTO UR CFM-MCNC: NEGATIVE NG/ML
PROPOXYPH CTO UR CFM-MCNC: NEGATIVE NG/ML
SIGNIFICANT IND 70042: NORMAL
SITE SITE: NORMAL
SOURCE SOURCE: NORMAL

## 2024-03-29 LAB
C TRACH RRNA CVX QL NAA+PROBE: NEGATIVE
CYTOLOGIST CVX/VAG CYTO: NORMAL
CYTOLOGY CVX/VAG DOC CYTO: NORMAL
CYTOLOGY CVX/VAG DOC THIN PREP: NORMAL
HPV I/H RISK 4 DNA CVX QL PROBE+SIG AMP: NEGATIVE
N GONORRHOEA RRNA CVX QL NAA+PROBE: NEGATIVE
NOTE NL11727A: NORMAL
OTHER STN SPEC: NORMAL
STAT OF ADQ CVX/VAG CYTO-IMP: NORMAL

## 2024-03-30 LAB — THC UR CFM-MCNC: >500 NG/ML

## 2024-04-02 ENCOUNTER — APPOINTMENT (OUTPATIENT)
Dept: RADIOLOGY | Facility: IMAGING CENTER | Age: 34
End: 2024-04-02
Attending: NURSE PRACTITIONER
Payer: MEDICAID

## 2024-04-02 DIAGNOSIS — O09.30 LATE PRENATAL CARE: ICD-10-CM

## 2024-04-02 PROCEDURE — 76805 OB US >/= 14 WKS SNGL FETUS: CPT | Mod: TC | Performed by: NURSE PRACTITIONER

## 2024-04-03 ENCOUNTER — TELEPHONE (OUTPATIENT)
Dept: OBGYN | Facility: CLINIC | Age: 34
End: 2024-04-03
Payer: MEDICAID

## 2024-04-03 DIAGNOSIS — O09.522 MULTIGRAVIDA OF ADVANCED MATERNAL AGE IN SECOND TRIMESTER: ICD-10-CM

## 2024-04-03 NOTE — TELEPHONE ENCOUNTER
----- Message from TONA Mendoza sent at 4/3/2024  9:49 AM PDT -----  F/u US in 2-4 weeks, please schedule for her. In meantime no sex or lifting more than 10 pounds and any bleeding to LnD, until we know where her placenta is located.     4/3/2024 1613  Pt notified of placenta previa, advised to be on pelvic rest, no lifting anything > 10lb, if notice VB to go to L&D ASAP. Pt will have an US to recheck placenta location in 2-4 weeks . Pt verbalized understanding. Pt transferred to [facility] to schedule US appt.

## 2024-04-04 ENCOUNTER — TELEPHONE (OUTPATIENT)
Dept: OBGYN | Facility: CLINIC | Age: 34
End: 2024-04-04
Payer: MEDICAID

## 2024-04-04 NOTE — TELEPHONE ENCOUNTER
Pt called to see if she is allowed to travel to [place], [place]. Advised pt that JUWAN Connors prescribed pelvic rest. Pelvic rest precautions were given. Pt understands. No further questions.

## 2024-04-24 ENCOUNTER — TELEPHONE (OUTPATIENT)
Dept: OBGYN | Facility: CLINIC | Age: 34
End: 2024-04-24

## 2024-04-24 ENCOUNTER — APPOINTMENT (OUTPATIENT)
Dept: RADIOLOGY | Facility: IMAGING CENTER | Age: 34
End: 2024-04-24
Attending: NURSE PRACTITIONER
Payer: MEDICAID

## 2024-04-24 ENCOUNTER — ROUTINE PRENATAL (OUTPATIENT)
Dept: OBGYN | Facility: CLINIC | Age: 34
End: 2024-04-24
Payer: MEDICAID

## 2024-04-24 VITALS — BODY MASS INDEX: 30.91 KG/M2 | WEIGHT: 169 LBS | SYSTOLIC BLOOD PRESSURE: 104 MMHG | DIASTOLIC BLOOD PRESSURE: 66 MMHG

## 2024-04-24 DIAGNOSIS — O09.522 MULTIGRAVIDA OF ADVANCED MATERNAL AGE IN SECOND TRIMESTER: ICD-10-CM

## 2024-04-24 DIAGNOSIS — F12.90 MARIJUANA USE: ICD-10-CM

## 2024-04-24 PROCEDURE — 3074F SYST BP LT 130 MM HG: CPT | Performed by: NURSE PRACTITIONER

## 2024-04-24 PROCEDURE — 76815 OB US LIMITED FETUS(S): CPT | Mod: TC | Performed by: NURSE PRACTITIONER

## 2024-04-24 PROCEDURE — 0502F SUBSEQUENT PRENATAL CARE: CPT | Performed by: NURSE PRACTITIONER

## 2024-04-24 PROCEDURE — 3078F DIAST BP <80 MM HG: CPT | Performed by: NURSE PRACTITIONER

## 2024-04-24 PROCEDURE — 90715 TDAP VACCINE 7 YRS/> IM: CPT | Performed by: NURSE PRACTITIONER

## 2024-04-24 PROCEDURE — 90471 IMMUNIZATION ADMIN: CPT | Performed by: NURSE PRACTITIONER

## 2024-04-24 ASSESSMENT — FIBROSIS 4 INDEX: FIB4 SCORE: 0.7

## 2024-04-24 NOTE — PROGRESS NOTES
SUBJECTIVE:  Pt is a 34 y.o.   at 27w3d  gestation. Presents today for follow-up prenatal care. Reports good  fetal movement. Denies regular cramping/contractions, bleeding or leaking of fluid. Denies dysuria, headaches, N/V. Generally feels well today. Cutting back on marijuana use but does cook with THC butter.     OBJECTIVE:  - See prenatal vitals flow  -   Vitals:    24 1133   BP: 104/66   Weight: 169 lb                 ASSESSMENT:   - IUP at 27w3d    - S=D   -   Patient Active Problem List    Diagnosis Date Noted    Advanced maternal age in multigravida 2024    Late prenatal care @ 24 weeks 2024    Grand multipara 2024    Marijuana use 2024    History of seizures 2024    History of postpartum hemorrhage 2024         PLAN:  - S/sx pregnancy and labor warning signs vs general discomforts discussed  - Fetal movements and/or kick counts reviewed   - Adequate hydration reinforced  - Nutrition/exercise/vitamin education; continue PNV  - Desires BTL  - Third tri labs ordered  - S/p Tdap vacc today   - US results pending from today   - Anticipatory guidance given  - RTC in 4 weeks for follow-up prenatal care

## 2024-04-24 NOTE — LETTER
"Count Your Baby's Movements  Another step to a healthy delivery    Toñasa Varela Yue  [facility]  Dept: [phone]    How Many Weeks Pregnant? 27w3d              How to use this chart    One way for your physician to keep track of your baby's health is by knowing how often the baby moves (or \"kicks\") in your womb.  You can help your physician to do this by using this chart every day.    Every day, you should see how many hours it takes for your baby to move 10 times.  Start in the morning, as soon as you get up.    First, write down the time your baby moves until you get to 10.  Check off one box every time your baby moves until you get to 10.  Write down the time you finished counting in the last column.  Total how long it took to count up all 10 movements.  Finally, fill in the box that shows how long this took.  After counting 10 movements, you no longer have to count any more that day.  The next morning, just start counting again as soon as you get up.    What should you call a \"movement\"?  It is hard to say, because it will feel different from one mother to another and from one pregnancy to the next.  The important thing is that you count the movements the same way throughout your pregnancy.  If you have more questions, you should ask your physician.    Count carefully every day!  SAMPLE:  Week 28    How many hours did it take to feel 10 movements?       Start  Time     1     2     3     4     5     6     7     8     9     10   Finish Time   Mon 8:20           11:40   Tue Wed Thu Fri               Sat               Sun                 IMPORTANT: You should contact your physician if it takes more than two hours for you to feel 10 movements.  Each morning, write down the time and start to count the movements of your baby.  Keep track by checking off one box every time you feel one movement.  When you have felt 10 \"kicks\", write down the " time you finished counting in the last column.  Then fill in the   box (over the check alisa) for the number of hours it took.  Be sure to read the complete instructions on the previous page.

## 2024-04-24 NOTE — PROGRESS NOTES
Pt. Here for OB/FU.   Reports Good FM.   Chaperone offered.   ANGELICA explained and given today.  Tdap given.   BTL signed.   Pt states she is dwindling down her THC intake.   Phone/Pharm verified.

## 2024-04-24 NOTE — TELEPHONE ENCOUNTER
----- Message from TONA Mendoza sent at 4/24/2024 12:43 PM PDT -----  Can you let pt know her placenta is in normal location so she can proceed with normal pregnancy precautions, sex, lifting up to 20 pounds, exercise etc.     4/24/2024  Called pt and informed as above. Pt verbalized understanding.

## 2024-05-22 ENCOUNTER — ROUTINE PRENATAL (OUTPATIENT)
Dept: OBGYN | Facility: CLINIC | Age: 34
End: 2024-05-22
Payer: MEDICAID

## 2024-05-22 VITALS — DIASTOLIC BLOOD PRESSURE: 70 MMHG | SYSTOLIC BLOOD PRESSURE: 110 MMHG | WEIGHT: 173 LBS | BODY MASS INDEX: 31.64 KG/M2

## 2024-05-22 DIAGNOSIS — O09.523 MULTIGRAVIDA OF ADVANCED MATERNAL AGE IN THIRD TRIMESTER: Primary | ICD-10-CM

## 2024-05-22 PROCEDURE — 3078F DIAST BP <80 MM HG: CPT | Performed by: OBSTETRICS & GYNECOLOGY

## 2024-05-22 PROCEDURE — 3074F SYST BP LT 130 MM HG: CPT | Performed by: OBSTETRICS & GYNECOLOGY

## 2024-05-22 PROCEDURE — 0501F PRENATAL FLOW SHEET: CPT | Performed by: OBSTETRICS & GYNECOLOGY

## 2024-05-22 ASSESSMENT — FIBROSIS 4 INDEX: FIB4 SCORE: 0.7

## 2024-05-22 NOTE — PROGRESS NOTES
Pt here today for OB follow up  Pt states she is experiencing irregular contractions and morning sickness  Has not done 3rd trimester labs  Reports +FM  Good # [phone] (home)    Pharmacy Confirmed.  Chaperone offered and declined.

## 2024-05-22 NOTE — PROGRESS NOTES
S: Pt presents for routine OB follow up. Good fetal movement.  No persistent contractions, vaginal bleeding, or leakage of fluid.  Questions answered.    O: /70   Wt 173 lb   LMP 10/08/2023 (Exact Date)   BMI 31.64 kg/m²   Patients' weight gain, fluid intake and exercise level discussed.  Vitals, fundal height , fetal position, and FHR reviewed on flowsheet    Lab:No results found for this or any previous visit (from the past 336 hour(s)).    A/P:  34 y.o.  at 31w3d presents for routine obstetric follow-up.  Size equals dates and/or scan    Diagnoses and all orders for this visit:    Multigravida of advanced maternal age in third trimester  - Plans to do 3rd trimester labs    1.  Continue prenatal vitamins.  2.  Fetal kick counts.  3.  Exercise at least 30 minutes daily.  4.  Drink at least 2L of water daily  5.  Labor precautions educated.  6.  Follow-up in 2 weeks.    Dotty Forrester M.D.

## 2024-05-25 ENCOUNTER — HOSPITAL ENCOUNTER (EMERGENCY)
Facility: MEDICAL CENTER | Age: 34
End: 2024-05-25
Attending: OBSTETRICS & GYNECOLOGY | Admitting: OBSTETRICS & GYNECOLOGY
Payer: MEDICAID

## 2024-05-25 VITALS
SYSTOLIC BLOOD PRESSURE: 129 MMHG | TEMPERATURE: 97.1 F | WEIGHT: 173 LBS | HEART RATE: 82 BPM | OXYGEN SATURATION: 99 % | RESPIRATION RATE: 16 BRPM | BODY MASS INDEX: 31.83 KG/M2 | HEIGHT: 62 IN | DIASTOLIC BLOOD PRESSURE: 62 MMHG

## 2024-05-25 PROCEDURE — 59025 FETAL NON-STRESS TEST: CPT | Performed by: OBSTETRICS & GYNECOLOGY

## 2024-05-25 PROCEDURE — 99283 EMERGENCY DEPT VISIT LOW MDM: CPT | Performed by: OBSTETRICS & GYNECOLOGY

## 2024-05-25 ASSESSMENT — FIBROSIS 4 INDEX: FIB4 SCORE: 0.7

## 2024-05-25 NOTE — ED PROVIDER NOTES
"LABOR & DELIVERY TRIAGE HISTORY AND PHYSICAL  Patient Name: Toña Turner Age/Sex: 34 y.o. female  : 1990 MRN: [id number]      HISTORY OF PRESENT ILLNESS:   Toña Turner is a 34 y.o.  at 31w6d weeks gestation by 8w3d ultrasound who is here for decreased fetal movement. Two days prior to arrival to OB triage, patient reports feeling decreased fetal movement compared to baseline. Denies any other symptoms, abdominal pain, headache, vision changes, bilateral lower extremity swelling. Does report some occasional round ligament pain with fetal movement.     Fetal movement: Decreased movement over the past two days, since arriving in triage has been feeling increased movement.   Leakage of Fluid: Denies  Vaginal Bleeding: Denies  Contractions: Occasional, able to speak through themand only mildly painful, irregularly spaced    Her EDC is 2024, by Ultrasound.   She has received prenatal care with [facility].  Complications during pregnancy: Late to prenatal care at 24weeks gestation, AMA, marijuana use during pregnancy, history of seizures, history of postpartum hemorrhage    History of STD: Denies     OBSTETRICAL HISTORY:    OB History    Para Term  AB Living   10 6 5   3 6   SAB IAB Ectopic Molar Multiple Live Births   3       0 6      # Outcome Date GA Lbr John/2nd Weight Sex Delivery Anes PTL Lv   10 Current            9 Para 10/28/19  / 00:01 2.645 kg (5 lb 13.3 oz) F Vag-Spont None N MACHO   8 Term 13 38w0d  3.289 kg (7 lb 4 oz) M Vag-Spont   MACHO   7 Term 12 38w0d  2.608 kg (5 lb 12 oz) M Vag-Spont   MACHO      Birth Comments: \"Baby delivered face up with his cord wrapped around his neck and tied in knots\",   6 Term 09 38w0d  2.835 kg (6 lb 4 oz) F Vag-Spont   MACHO   5 Term 08 38w0d  2.835 kg (6 lb 4 oz) F Vag-Spont   MACHO   4 Term 10/19/06 38w0d  2.58 kg (5 lb 11 oz) M Vag-Spont   MACHO   3 SAB            2 SAB            1 SAB            Hx of postpartum seizure with " first pregnancy, patient reports this as an eclamptic seizure, states that a diagnosis of pre-eclampsia was missed during that pregnancy. Also states she has a family history of epilepsy.    MEDICAL HISTORY:    No past medical history on file.    SURGICAL HISTORY:    Past Surgical History:   Procedure Laterality Date    OTHER ABDOMINAL SURGERY  2009    appendectomy    OTHER  1998    tonselectomy       MEDICATIONS:    No medications prior to admission.       ALLERGIES:    Allergies   Allergen Reactions    Amoxicillin Rash     Hives      Codeine Hives    Latex Hives    Pcn [Penicillins] Rash    Vicodin [Hydrocodone-Acetaminophen] Rash        SOCIAL HISTORY:   Tobacco use: Denies current use, quit smoking cigarettes 12 years ago  Alcohol use: None currently  Recreational drug use: Current marijuana use for nausea   reports that she quit smoking about 12 years ago. Her smoking use included cigarettes. She has never used smokeless tobacco. She reports that she does not currently use alcohol. She reports current drug use. Drug: Marijuana.    FAMILY HISTORY:  Patient reports family history of epilepsy    Family History   Problem Relation Age of Onset    Cancer Mother         Lieukemia    Diabetes Mother     No Known Problems Father        REVIEW OF SYSTEMS:  Pertinent items are noted in HPI.      PHYSICAL EXAM:  Temp:  [36.2 °C (97.1 °F)] 36.2 °C (97.1 °F)  Pulse:  [82] 82  Resp:  [16] 16  BP: (129)/(62) 129/62  SpO2:  [99 %] 99 %  Body mass index is 31.64 kg/m².    General:  AAOx3, NAD, calm disposition   CV: RRR, no M/R/G   Pulmonary:  CTAB, normal effort   Abdomen: Soft and non-tender, gravid uterus   FHT: Baseline 140/moderate variability/+accels/-decels   Contractions: Baseline irritability, irregular ctx   SVE: 0/50%/-4 @ 14:00 per RN     Prenatal Labs:  HepBSAg NR  HIV NR  RPR NR  Rubella Immune  ABO O+    Group B Strep: not collected yet      ASSESSMENT/ PLAN:   Toña Turner is a 34 y.o.  at 31w6d  gestation by 8w3d ultrasound who is here for decreased fetal movement.    #Decreased Fetal Movement  - Patient with reactive NST in Triage, reporting return of baseline fetal movement while in Triage  - Given reports of contractions, cervical check was done and showed closed cervix, no concern at this time for  labor  - Patient stable for discharge home with return precautions for  labor including loss of fluids, vaginal bleeding, contractions q3-5min apart and increasing in severity.     #SIUP @ 31w6d  #Late to Prenatal Care @ 24wks  - Follows with [facility]  - 24 OB limited US with no fetal anatomic abnormalities  - 24 Complete US showed EFW 78.4%ile     Patient medically stable for discharge home. Follow-up appointment scheduled with [facility] on 24.    Stefany Castano MD   PGY-1 Resident   [facility]

## 2024-05-25 NOTE — DISCHARGE INSTRUCTIONS
Pre-term Labor (<37 weeks):  Call your physician or return to the hospital if:  You have painless regular contractions more than 4 in one hour.  Your water breaks (remember time and color).  You have menstrual-like cramps, a low dull backache or pressure in your pelvis or back.  Your baby does not move enough to complete the daily kick count (10 movements in 2 hours).  Your baby moves much less often than on the days before or you have not felt your baby move all day.  Please review the MEDICATION LIST section of your AFTER VISIT SUMMARY document.  Take your medication as prescribed  Labor Instructions (37 - 39 weeks):  Call your physician or return to hospital if:  You have regular contractions that get progressively closer, longer and stronger.  Your water breaks (remember time and color).  You have bleeding like a period.  Your baby does not move enough to complete the daily kick counts (10 movements in 2 hours)  Your baby moves much less often than on the days before or you have not felt your baby move all day.

## 2024-05-25 NOTE — PROGRESS NOTES
Pt presents to L&D with complaints of decreased FM. Pt ambulated to LDA 2 for assessment.     1324 TOCO and EFM applied, VSS. Pt reports a decrease in FM over the past two days. Pt states she is still feeling FM but it has changed. Upon placing monitors, audible FM heard and felt by pt. Pt reassured at this time and FHT discussed. Pt does report some irregular contractions but nothing consistent or painful. Pt denies LOF or VB.     1350 Dr. Johnson updated, orders for SVE and plan for discharge.     1355 RN at bedside, SVE as charted.     1400 Dr. Castano at bedside, orders for discharge received.     1408  labor precautions given and all questions answered. Pt discharged home in stable condition.

## 2024-06-04 ENCOUNTER — ROUTINE PRENATAL (OUTPATIENT)
Dept: OBGYN | Facility: CLINIC | Age: 34
End: 2024-06-04
Payer: MEDICAID

## 2024-06-04 VITALS — DIASTOLIC BLOOD PRESSURE: 72 MMHG | BODY MASS INDEX: 31.46 KG/M2 | WEIGHT: 172 LBS | SYSTOLIC BLOOD PRESSURE: 94 MMHG

## 2024-06-04 DIAGNOSIS — O09.523 MULTIGRAVIDA OF ADVANCED MATERNAL AGE IN THIRD TRIMESTER: ICD-10-CM

## 2024-06-04 DIAGNOSIS — Z30.2 REQUEST FOR STERILIZATION: ICD-10-CM

## 2024-06-04 PROCEDURE — 3078F DIAST BP <80 MM HG: CPT | Performed by: NURSE PRACTITIONER

## 2024-06-04 PROCEDURE — 0502F SUBSEQUENT PRENATAL CARE: CPT | Performed by: NURSE PRACTITIONER

## 2024-06-04 PROCEDURE — 3074F SYST BP LT 130 MM HG: CPT | Performed by: NURSE PRACTITIONER

## 2024-06-04 ASSESSMENT — FIBROSIS 4 INDEX: FIB4 SCORE: 0.7

## 2024-06-04 NOTE — PROGRESS NOTES
Pt. Here for OB/FU.   Reports Good FM.   Chaperone offered and indicated.   Pt states she has been having a sharp shooting pain on her R side that goes down her leg. Also having BH.   Pt was told she has a lot of amniotic fluid.   Phone/Pharm verified.

## 2024-06-04 NOTE — PROGRESS NOTES
SUBJECTIVE:  Pt is a 34 y.o.   at 33w2d  gestation. Presents today for follow-up prenatal care. Reports good  fetal movement. Denies regular cramping/contractions, bleeding or leaking of fluid. Denies dysuria, headaches, N/V. Generally feels well today except pelvic pressure and siatic nerve pains.      OBJECTIVE:  - See prenatal vitals flow  -   Vitals:    24 1133   BP: 94/72   Weight: 172 lb                 ASSESSMENT:   - IUP at 33w2d    - S>D   -   Patient Active Problem List    Diagnosis Date Noted    Request for sterilization 2024    Advanced maternal age in multigravida 2024    Late prenatal care @ 24 weeks 2024    Grand multipara 2024    Marijuana use 2024    History of seizures 2024    History of postpartum hemorrhage 2024         PLAN:  - S/sx pregnancy and labor warning signs vs general discomforts discussed  - Fetal movements and/or kick counts reviewed   - Adequate hydration reinforced  - Nutrition/exercise/vitamin education; continue PNV  - Growth US ordered  - Encouraged diabetes testing asap, has transportation issues  - Anticipatory guidance given  - RTC in 2 weeks for follow-up prenatal care

## 2024-06-20 ENCOUNTER — ROUTINE PRENATAL (OUTPATIENT)
Dept: OBGYN | Facility: CLINIC | Age: 34
End: 2024-06-20
Payer: MEDICAID

## 2024-06-20 ENCOUNTER — APPOINTMENT (OUTPATIENT)
Dept: RADIOLOGY | Facility: IMAGING CENTER | Age: 34
End: 2024-06-20
Attending: NURSE PRACTITIONER
Payer: MEDICAID

## 2024-06-20 VITALS — DIASTOLIC BLOOD PRESSURE: 68 MMHG | BODY MASS INDEX: 31.64 KG/M2 | SYSTOLIC BLOOD PRESSURE: 116 MMHG | WEIGHT: 173 LBS

## 2024-06-20 DIAGNOSIS — O09.523 MULTIGRAVIDA OF ADVANCED MATERNAL AGE IN THIRD TRIMESTER: ICD-10-CM

## 2024-06-20 PROCEDURE — 76816 OB US FOLLOW-UP PER FETUS: CPT | Mod: TC | Performed by: NURSE PRACTITIONER

## 2024-06-20 PROCEDURE — 99212 OFFICE O/P EST SF 10 MIN: CPT | Performed by: OBSTETRICS & GYNECOLOGY

## 2024-06-20 PROCEDURE — 3074F SYST BP LT 130 MM HG: CPT | Performed by: OBSTETRICS & GYNECOLOGY

## 2024-06-20 PROCEDURE — 3078F DIAST BP <80 MM HG: CPT | Performed by: OBSTETRICS & GYNECOLOGY

## 2024-06-20 ASSESSMENT — FIBROSIS 4 INDEX: FIB4 SCORE: 0.7

## 2024-06-20 NOTE — PROGRESS NOTES
Pt here today for OB follow up  Pt states she is having trouble sleeping, experiencing contractions about 7-10 min apart lasting 35-45 seconds.  Reports +FM  Good # [phone]  Pharmacy Confirmed.  Chaperone offered and not indicated.   Pt has growth u/s scheduled today   Pt states she does not know when she will be completing 3rd tri labs due to transportation. Pt states she will sign refusal today.

## 2024-06-21 ENCOUNTER — TELEPHONE (OUTPATIENT)
Dept: OBGYN | Facility: CLINIC | Age: 34
End: 2024-06-21
Payer: MEDICAID

## 2024-06-21 PROBLEM — O26.849 FETAL SIZE INCONSISTENT WITH DATES: Status: ACTIVE | Noted: 2024-06-21

## 2024-06-21 NOTE — TELEPHONE ENCOUNTER
----- Message from TONA Mendoza sent at 6/21/2024  7:14 AM PDT -----  Can pt get a appt with MD to discuss shoulder dystocia risk with large baby, we will also plan to deliver her early at 39 weeks if pt is agreeable to this plan due to a large baby and large shoulders. She needs to get GDM testing asap as she may be diabetic, very important.     6/21/2024 1007  Called pt and notified of US results - that baby measures 2-1/2 weeks larger than dates. Pt verbalized understanding. Informed of plan to deliver baby early d/t size, pt stated that she is agreeable to that.  Encouraged pt to get GDM testing done, pt stated that she has an appt for 6/27/2024 in the [facility] lab at 1200, prior to her 1330 appt with Dr Gregory.

## 2024-06-25 ENCOUNTER — HOSPITAL ENCOUNTER (EMERGENCY)
Facility: MEDICAL CENTER | Age: 34
End: 2024-06-25
Attending: OBSTETRICS & GYNECOLOGY | Admitting: OBSTETRICS & GYNECOLOGY
Payer: MEDICAID

## 2024-06-25 VITALS
HEART RATE: 99 BPM | TEMPERATURE: 97.3 F | RESPIRATION RATE: 18 BRPM | DIASTOLIC BLOOD PRESSURE: 74 MMHG | OXYGEN SATURATION: 94 % | WEIGHT: 173 LBS | HEIGHT: 62 IN | BODY MASS INDEX: 31.83 KG/M2 | SYSTOLIC BLOOD PRESSURE: 115 MMHG

## 2024-06-25 PROCEDURE — 99284 EMERGENCY DEPT VISIT MOD MDM: CPT

## 2024-06-25 PROCEDURE — 99282 EMERGENCY DEPT VISIT SF MDM: CPT | Mod: 25,GC | Performed by: OBSTETRICS & GYNECOLOGY

## 2024-06-25 PROCEDURE — 59025 FETAL NON-STRESS TEST: CPT | Mod: 26 | Performed by: OBSTETRICS & GYNECOLOGY

## 2024-06-25 PROCEDURE — 59025 FETAL NON-STRESS TEST: CPT

## 2024-06-25 ASSESSMENT — FIBROSIS 4 INDEX: FIB4 SCORE: 0.7

## 2024-06-25 NOTE — PROGRESS NOTES
1040: pt to labor and delivery c/o abdominal trauma on Friday.  Pt reports opening a door into her abdomen.  Pt denies pain, vaginal bleeding or leaking and reports fetal movement at time of trauma.  However, today reports increased pain where her abdomen was struck by the door.  Pt's uterus is tender on palpation. No abdominal bruising or redness noted.  Pt denies vaginal bleeding, leaking today.  Pt reports fetal movement today and since Friday.  Growth US on Thursday shows posterior placenta and pt is O+ blood type. Report to Dr. Josh MD to bs.     1200: Dr. Moreno and Dr. So at bs, discuss poc. Sve 1-2/50/-4, vertex per US. Orders to recheck in 2 hours.     1400: RN to bs, sve 1-2/50/-4, no cervical change noted. Report to Dr. So, orders to discharge home with labor precautions. Pt discharged in stable condition.

## 2024-06-25 NOTE — ED PROVIDER NOTES
"LABOR & DELIVERY TRIAGE HISTORY AND PHYSICAL  Patient Name: Toña Turner Age/Sex: 34 y.o. female  : 1990 MRN: [id number]      HISTORY OF PRESENT ILLNESS:   Toña Turner is a 34 y.o.  at 36w2d weeks gestation by 8w3d ultrasound who is here for abdominal trauma from a door. On Friday patient reports that she was going into a building and opened the front door and hit her abdomen. She denies any pain immediately after. She reports that the pain started yesterday at 1000, it is sharp and centrally located from upper to lower quadrant in a strip. She states this pain is different that the occasional contractions. She has taken tylenol with little alleviation. She is able to tolerate PO, she has not had any emesis.     Hydration: she is drinking tea and ice chips     positive - irregular for contractions  positive pain   negative for LOF  negative for vaginal bleeding  positive for fetal movement    Her EDC is 2024, by Ultrasound.   She has received prenatal care with [facility].  Complications during pregnancy:   Patient Active Problem List   Diagnosis    Advanced maternal age in multigravida    Late prenatal care @ 24 weeks    Grand multipara    Marijuana use    History of seizures    History of postpartum hemorrhage    Request for sterilization    S>D         OBSTETRICAL HISTORY:  OB History    Para Term  AB Living   10 6 5   3 6   SAB IAB Ectopic Molar Multiple Live Births   3       0 6      # Outcome Date GA Lbr John/2nd Weight Sex Delivery Anes PTL Lv   10 Current            9 Para 10/28/19  / 00:01 2.645 kg (5 lb 13.3 oz) F Vag-Spont None N MACHO   8 Term 13 38w0d  3.289 kg (7 lb 4 oz) M Vag-Spont   MACHO   7 Term 12 38w0d  2.608 kg (5 lb 12 oz) M Vag-Spont   MACHO      Birth Comments: \"Baby delivered face up with his cord wrapped around his neck and tied in knots\",   6 Term 09 38w0d  2.835 kg (6 lb 4 oz) F Vag-Spont   MACHO   5 Term 08 38w0d  2.835 kg (6 lb 4 oz) F " "Vag-Spont   MACHO   4 Term 10/19/06 38w0d  2.58 kg (5 lb 11 oz) M Vag-Spont   MACHO   3 SAB            2 SAB            1 SAB                MEDICAL HISTORY:    No past medical history on file.    SURGICAL HISTORY:    Past Surgical History:   Procedure Laterality Date    OTHER ABDOMINAL SURGERY      appendectomy    OTHER  1998    tonselectomy       MEDICATIONS:    Medications Prior to Admission   Medication Sig Dispense Refill Last Dose    prenatal plus vitamin (STUARTNATAL 1+1) 27-1 MG Tab tablet Take 1 Tab by mouth every morning. 30 Tab 3        ALLERGIES:    Allergies   Allergen Reactions    Amoxicillin Rash     Hives      Codeine Hives    Latex Hives    Pcn [Penicillins] Rash    Vicodin [Hydrocodone-Acetaminophen] Rash        SOCIAL HISTORY:    reports that she quit smoking about 12 years ago. Her smoking use included cigarettes. She has never used smokeless tobacco. She reports that she does not currently use alcohol. She reports current drug use. Drug: Marijuana.    FAMILY HISTORY:  Family History   Problem Relation Age of Onset    Cancer Mother         Lieukemia    Diabetes Mother     No Known Problems Father        REVIEW OF SYSTEMS:  Pertinent items are noted in HPI.      PHYSICAL EXAM:    Vitals:    24 1042   BP: 115/74   Pulse: 99   Resp: 18   Temp: 36.3 °C (97.3 °F)   TempSrc: Temporal   SpO2: 94%   Weight: 78.5 kg (173 lb)   Height: 1.575 m (5' 2\")     Temp (24hrs), Av.3 °C (97.3 °F), Min:36.3 °C (97.3 °F), Max:36.3 °C (97.3 °F)    General: No acute distress, resting comfortably in bed.  HEENT: normocephalic, nontraumatic, PERRLA, EOMI  Cardiovascular: Heart RRR with no murmurs, rubs or gallops. Distal Pulses 2+  Respiratory: symmetric chest expansion, lungs CTAB, with no wheezes, rales, rhonci  Abdomen: gravid, tenderness at the inguinal canal bilaterally and the fundus  Musculoskeletal: LEON spontaneously  Neuro: non focal with no numbness, tingling or changes in sensation    Cervix:  " 1-2/50/-4 vertex, per Dr. So @1155  [place]: Q3-6 minutes, irregular    FHRM: Baseline 135, mod variability, + accels, no decels      Prenatal Labs:  HepBSAg NR  HepC NR  HIV NR  RPR NR  Rubella immune   ABO O+     Group B Strep: unk       ASSESSMENT/ PLAN:   Toña Turner is a 34 y.o.  at 36w2d gestation by 8w3d ultrasound who is here for abdominal trauma on 24. Pain likely due to round ligament pain.    - Recheck cervix at 1400: no cervical change 1-2cm per nursing   - Discussed adequate hydration   - Discussed staying with family in [place] if possible  - Patient is cleared to return home with family. Encouraged to see MD/DO for increased painful uterine contractions @ 3-5, vaginal bleeding, loss of fluid, or other serious symptoms.      Discussed case with Dr. So, [facility] Attending. Case was discussed and attending agreed with plan prior to discharge of patient.      No follow-up provider specified.     Future Appointments   Date Time Provider Department Center   2024 12:00 PM LAB K[facility]ORE NOVAK   2024  1:30 PM Hira Gregory Jr., M.D. PCTR KISHORE Moreno MD   PGY-1 Resident   [facility]

## 2024-06-27 ENCOUNTER — HOSPITAL ENCOUNTER (OUTPATIENT)
Dept: LAB | Facility: MEDICAL CENTER | Age: 34
End: 2024-06-27
Attending: NURSE PRACTITIONER
Payer: MEDICAID

## 2024-06-27 ENCOUNTER — ROUTINE PRENATAL (OUTPATIENT)
Dept: OBGYN | Facility: CLINIC | Age: 34
End: 2024-06-27
Payer: MEDICAID

## 2024-06-27 ENCOUNTER — HOSPITAL ENCOUNTER (OUTPATIENT)
Facility: MEDICAL CENTER | Age: 34
End: 2024-06-27
Attending: OBSTETRICS & GYNECOLOGY
Payer: MEDICAID

## 2024-06-27 VITALS — SYSTOLIC BLOOD PRESSURE: 108 MMHG | BODY MASS INDEX: 32.19 KG/M2 | WEIGHT: 176 LBS | DIASTOLIC BLOOD PRESSURE: 74 MMHG

## 2024-06-27 DIAGNOSIS — O09.523 MULTIGRAVIDA OF ADVANCED MATERNAL AGE IN THIRD TRIMESTER: ICD-10-CM

## 2024-06-27 DIAGNOSIS — O09.522 MULTIGRAVIDA OF ADVANCED MATERNAL AGE IN SECOND TRIMESTER: ICD-10-CM

## 2024-06-27 LAB
ERYTHROCYTE [DISTWIDTH] IN BLOOD BY AUTOMATED COUNT: 43.8 FL (ref 35.9–50)
GLUCOSE 1H P 50 G GLC PO SERPL-MCNC: 187 MG/DL (ref 70–139)
HCT VFR BLD AUTO: 35.2 % (ref 37–47)
HGB BLD-MCNC: 11.2 G/DL (ref 12–16)
MCH RBC QN AUTO: 26.7 PG (ref 27–33)
MCHC RBC AUTO-ENTMCNC: 31.8 G/DL (ref 32.2–35.5)
MCV RBC AUTO: 83.8 FL (ref 81.4–97.8)
PLATELET # BLD AUTO: 165 K/UL (ref 164–446)
PMV BLD AUTO: 11.5 FL (ref 9–12.9)
RBC # BLD AUTO: 4.2 M/UL (ref 4.2–5.4)
T PALLIDUM AB SER QL IA: NORMAL
WBC # BLD AUTO: 9.7 K/UL (ref 4.8–10.8)

## 2024-06-27 PROCEDURE — 85027 COMPLETE CBC AUTOMATED: CPT

## 2024-06-27 PROCEDURE — 86780 TREPONEMA PALLIDUM: CPT

## 2024-06-27 PROCEDURE — 87150 DNA/RNA AMPLIFIED PROBE: CPT

## 2024-06-27 PROCEDURE — 36415 COLL VENOUS BLD VENIPUNCTURE: CPT

## 2024-06-27 PROCEDURE — 3074F SYST BP LT 130 MM HG: CPT | Performed by: OBSTETRICS & GYNECOLOGY

## 2024-06-27 PROCEDURE — 3078F DIAST BP <80 MM HG: CPT | Performed by: OBSTETRICS & GYNECOLOGY

## 2024-06-27 PROCEDURE — 82950 GLUCOSE TEST: CPT

## 2024-06-27 PROCEDURE — 87081 CULTURE SCREEN ONLY: CPT

## 2024-06-27 PROCEDURE — 99212 OFFICE O/P EST SF 10 MIN: CPT | Performed by: OBSTETRICS & GYNECOLOGY

## 2024-06-27 ASSESSMENT — FIBROSIS 4 INDEX: FIB4 SCORE: 0.7

## 2024-06-27 NOTE — PROGRESS NOTES
Pt here today for OBFV   +FM movemnet  No VB, LOF. Uc's   Phone number /Pharmacy verified   GBS- today

## 2024-06-27 NOTE — PROGRESS NOTES
Name: Licha Turner  Medical Record Number:  [id number]  YOB: 1990  Date of Service: 2024    The patient is a 34 y.o. , with an SNEHAL of 2024, by Ultrasound dating method.  Please see the active problem list for a full description of the issues for which the patient was evaluated for today.      She was seen today for maternal fetal medicine consultation.      Physical Examination: General appearance - alert, well appearing, and in no distress    The patient's past medical history and prenatal records were reviewed.  Additional issues addressed and updated today:  Patient Active Problem List   Diagnosis    Advanced maternal age in multigravida    Late prenatal care @ 24 weeks    Grand multipara    Marijuana use    History of seizures    History of postpartum hemorrhage    Request for sterilization    S>D     Family History   Problem Relation Age of Onset    Cancer Mother         Lieukemia    Diabetes Mother     No Known Problems Father      Social History     Socioeconomic History    Marital status:    Tobacco Use    Smoking status: Former     Current packs/day: 0.00     Types: Cigarettes     Quit date: 2012     Years since quittin.2    Smokeless tobacco: Never   Vaping Use    Vaping status: Never Used   Substance and Sexual Activity    Alcohol use: Not Currently     Comment: Rare Occ    Drug use: Yes     Types: Marijuana     Comment: marijuana edibles    Sexual activity: Yes     Partners: Male     Birth control/protection: None, Condom       The patient had an ultrasound showing macrosmia. We have a 1 hour pending and will rescan in 3 weeks. Plan for delivery at 39 to 40 weeks. Route to be determined based on next scan. If labors before then, vaginal delivery should be OK.    GBS done today.    Hira Gregory Jr., M.D.

## 2024-06-27 NOTE — PROGRESS NOTES
Pt. Here for OB/FU.   Reports Good FM.   Chaperone offered and indicated.   GBS today.   L&D visit on 6/25 - hit L side of her stomach.   Pt wants to discuss pain medication during delivery. Looking at alternatives for epidural.   Phone/Pharm verified.

## 2024-06-28 DIAGNOSIS — R73.09 ELEVATED GLUCOSE: ICD-10-CM

## 2024-06-29 LAB
B-HEM STREP GENITAL QL CULT: NORMAL
GP B STREP DNA SPEC QL NAA+PROBE: POSITIVE
SIGNIFICANT IND 70042: NORMAL
SITE SITE: NORMAL
SOURCE SOURCE: NORMAL

## 2024-07-01 ENCOUNTER — TELEPHONE (OUTPATIENT)
Dept: OBGYN | Facility: CLINIC | Age: 34
End: 2024-07-01
Payer: MEDICAID

## 2024-07-01 ENCOUNTER — TELEPHONE (OUTPATIENT)
Dept: OBGYN | Facility: CLINIC | Age: 34
End: 2024-07-01

## 2024-07-01 LAB
B-HEM STREP GENITAL QL CULT: NORMAL
SIGNIFICANT IND 70042: NORMAL
SITE SITE: NORMAL
SOURCE SOURCE: NORMAL

## 2024-07-01 NOTE — TELEPHONE ENCOUNTER
Patient called stating she is 37w1d and states does not want to do the 3hr glucose due to not tolerating sitting down for 4 hours at the lab and that the lab prohibits her from standing up. Pt states Dr. Gregory informed her that baby is big but that she may monitor her diet. Pt also states that she was informed of a refusal form to sign regarding the 3hr informed patient there is but that it is highly recommended she do the 3hr due to failing the 1hr. Pt states will go ahead and wait to discuss with Nurse Midwife Car regarding all this and a possible alternative and would like this message passed on to provider so that they may be aware of her concerns.

## 2024-07-03 ENCOUNTER — ROUTINE PRENATAL (OUTPATIENT)
Dept: OBGYN | Facility: CLINIC | Age: 34
End: 2024-07-03
Payer: MEDICAID

## 2024-07-03 ENCOUNTER — HOSPITAL ENCOUNTER (OUTPATIENT)
Dept: LAB | Facility: MEDICAL CENTER | Age: 34
End: 2024-07-03
Attending: ADVANCED PRACTICE MIDWIFE
Payer: MEDICAID

## 2024-07-03 VITALS — SYSTOLIC BLOOD PRESSURE: 122 MMHG | BODY MASS INDEX: 32.37 KG/M2 | WEIGHT: 177 LBS | DIASTOLIC BLOOD PRESSURE: 66 MMHG

## 2024-07-03 DIAGNOSIS — O09.523 MULTIGRAVIDA OF ADVANCED MATERNAL AGE IN THIRD TRIMESTER: ICD-10-CM

## 2024-07-03 DIAGNOSIS — O26.843 UTERINE SIZE DATE DISCREPANCY PREGNANCY, THIRD TRIMESTER: ICD-10-CM

## 2024-07-03 DIAGNOSIS — O40.3XX0 POLYHYDRAMNIOS AFFECTING PREGNANCY IN THIRD TRIMESTER: ICD-10-CM

## 2024-07-03 LAB
EST. AVERAGE GLUCOSE BLD GHB EST-MCNC: 111 MG/DL
HBA1C MFR BLD: 5.5 % (ref 4–5.6)

## 2024-07-03 PROCEDURE — 76818 FETAL BIOPHYS PROFILE W/NST: CPT | Performed by: STUDENT IN AN ORGANIZED HEALTH CARE EDUCATION/TRAINING PROGRAM

## 2024-07-03 PROCEDURE — 36415 COLL VENOUS BLD VENIPUNCTURE: CPT

## 2024-07-03 PROCEDURE — 83036 HEMOGLOBIN GLYCOSYLATED A1C: CPT

## 2024-07-03 PROCEDURE — 3074F SYST BP LT 130 MM HG: CPT | Performed by: ADVANCED PRACTICE MIDWIFE

## 2024-07-03 PROCEDURE — 3078F DIAST BP <80 MM HG: CPT | Performed by: ADVANCED PRACTICE MIDWIFE

## 2024-07-03 PROCEDURE — 0502F SUBSEQUENT PRENATAL CARE: CPT | Performed by: ADVANCED PRACTICE MIDWIFE

## 2024-07-03 RX ORDER — GLUCOSAMINE HCL/CHONDROITIN SU 500-400 MG
CAPSULE ORAL
Qty: 100 EACH | Refills: 0 | Status: SHIPPED | OUTPATIENT
Start: 2024-07-03

## 2024-07-03 RX ORDER — LANCETS 30 GAUGE
EACH MISCELLANEOUS
Qty: 100 EACH | Refills: 3 | Status: SHIPPED | OUTPATIENT
Start: 2024-07-03

## 2024-07-03 ASSESSMENT — FIBROSIS 4 INDEX: FIB4 SCORE: 0.78

## 2024-07-04 ENCOUNTER — ANESTHESIA EVENT (OUTPATIENT)
Dept: ANESTHESIOLOGY | Facility: MEDICAL CENTER | Age: 34
End: 2024-07-04
Payer: MEDICAID

## 2024-07-04 ENCOUNTER — ANESTHESIA (OUTPATIENT)
Dept: ANESTHESIOLOGY | Facility: MEDICAL CENTER | Age: 34
End: 2024-07-04
Payer: MEDICAID

## 2024-07-04 ENCOUNTER — HOSPITAL ENCOUNTER (INPATIENT)
Facility: MEDICAL CENTER | Age: 34
LOS: 3 days | End: 2024-07-07
Attending: OBSTETRICS & GYNECOLOGY | Admitting: OBSTETRICS & GYNECOLOGY
Payer: MEDICAID

## 2024-07-04 LAB
ABO GROUP BLD: NORMAL
ALBUMIN SERPL BCP-MCNC: 3.3 G/DL (ref 3.2–4.9)
ALBUMIN/GLOB SERPL: 1.1 G/DL
ALP SERPL-CCNC: 118 U/L (ref 30–99)
ALT SERPL-CCNC: 10 U/L (ref 2–50)
ANION GAP SERPL CALC-SCNC: 14 MMOL/L (ref 7–16)
AST SERPL-CCNC: 12 U/L (ref 12–45)
BASOPHILS # BLD AUTO: 0.2 % (ref 0–1.8)
BASOPHILS # BLD: 0.02 K/UL (ref 0–0.12)
BILIRUB SERPL-MCNC: 0.3 MG/DL (ref 0.1–1.5)
BLD GP AB SCN SERPL QL: NORMAL
BUN SERPL-MCNC: 7 MG/DL (ref 8–22)
CALCIUM ALBUM COR SERPL-MCNC: 10.1 MG/DL (ref 8.5–10.5)
CALCIUM SERPL-MCNC: 9.5 MG/DL (ref 8.5–10.5)
CHLORIDE SERPL-SCNC: 103 MMOL/L (ref 96–112)
CO2 SERPL-SCNC: 18 MMOL/L (ref 20–33)
CREAT SERPL-MCNC: 0.51 MG/DL (ref 0.5–1.4)
EOSINOPHIL # BLD AUTO: 0.14 K/UL (ref 0–0.51)
EOSINOPHIL NFR BLD: 1.4 % (ref 0–6.9)
ERYTHROCYTE [DISTWIDTH] IN BLOOD BY AUTOMATED COUNT: 42.8 FL (ref 35.9–50)
GFR SERPLBLD CREATININE-BSD FMLA CKD-EPI: 124 ML/MIN/1.73 M 2
GLOBULIN SER CALC-MCNC: 2.9 G/DL (ref 1.9–3.5)
GLUCOSE SERPL-MCNC: 122 MG/DL (ref 65–99)
HCT VFR BLD AUTO: 34 % (ref 37–47)
HGB BLD-MCNC: 10.7 G/DL (ref 12–16)
IMM GRANULOCYTES # BLD AUTO: 0.06 K/UL (ref 0–0.11)
IMM GRANULOCYTES NFR BLD AUTO: 0.6 % (ref 0–0.9)
LYMPHOCYTES # BLD AUTO: 1.87 K/UL (ref 1–4.8)
LYMPHOCYTES NFR BLD: 18.2 % (ref 22–41)
MCH RBC QN AUTO: 25.6 PG (ref 27–33)
MCHC RBC AUTO-ENTMCNC: 31.5 G/DL (ref 32.2–35.5)
MCV RBC AUTO: 81.3 FL (ref 81.4–97.8)
MONOCYTES # BLD AUTO: 0.81 K/UL (ref 0–0.85)
MONOCYTES NFR BLD AUTO: 7.9 % (ref 0–13.4)
NEUTROPHILS # BLD AUTO: 7.38 K/UL (ref 1.82–7.42)
NEUTROPHILS NFR BLD: 71.7 % (ref 44–72)
NRBC # BLD AUTO: 0 K/UL
NRBC BLD-RTO: 0 /100 WBC (ref 0–0.2)
PLATELET # BLD AUTO: 190 K/UL (ref 164–446)
PMV BLD AUTO: 10.8 FL (ref 9–12.9)
POTASSIUM SERPL-SCNC: 3.6 MMOL/L (ref 3.6–5.5)
PROT SERPL-MCNC: 6.2 G/DL (ref 6–8.2)
RBC # BLD AUTO: 4.18 M/UL (ref 4.2–5.4)
RH BLD: NORMAL
SODIUM SERPL-SCNC: 135 MMOL/L (ref 135–145)
T PALLIDUM AB SER QL IA: NORMAL
WBC # BLD AUTO: 10.3 K/UL (ref 4.8–10.8)

## 2024-07-04 PROCEDURE — 80053 COMPREHEN METABOLIC PANEL: CPT

## 2024-07-04 PROCEDURE — 86901 BLOOD TYPING SEROLOGIC RH(D): CPT

## 2024-07-04 PROCEDURE — 10907ZC DRAINAGE OF AMNIOTIC FLUID, THERAPEUTIC FROM PRODUCTS OF CONCEPTION, VIA NATURAL OR ARTIFICIAL OPENING: ICD-10-PCS | Performed by: OBSTETRICS & GYNECOLOGY

## 2024-07-04 PROCEDURE — 770002 HCHG ROOM/CARE - OB PRIVATE (112)

## 2024-07-04 PROCEDURE — 86900 BLOOD TYPING SEROLOGIC ABO: CPT

## 2024-07-04 PROCEDURE — 99282 EMERGENCY DEPT VISIT SF MDM: CPT

## 2024-07-04 PROCEDURE — 700105 HCHG RX REV CODE 258: Performed by: ADVANCED PRACTICE MIDWIFE

## 2024-07-04 PROCEDURE — 36415 COLL VENOUS BLD VENIPUNCTURE: CPT

## 2024-07-04 PROCEDURE — 700111 HCHG RX REV CODE 636 W/ 250 OVERRIDE (IP): Mod: JZ | Performed by: ADVANCED PRACTICE MIDWIFE

## 2024-07-04 PROCEDURE — 86850 RBC ANTIBODY SCREEN: CPT

## 2024-07-04 PROCEDURE — 85025 COMPLETE CBC W/AUTO DIFF WBC: CPT

## 2024-07-04 PROCEDURE — 86780 TREPONEMA PALLIDUM: CPT

## 2024-07-04 RX ORDER — LIDOCAINE HYDROCHLORIDE 10 MG/ML
20 INJECTION, SOLUTION INFILTRATION; PERINEURAL
Status: DISCONTINUED | OUTPATIENT
Start: 2024-07-04 | End: 2024-07-05 | Stop reason: HOSPADM

## 2024-07-04 RX ORDER — OXYTOCIN 10 [USP'U]/ML
10 INJECTION, SOLUTION INTRAMUSCULAR; INTRAVENOUS
Status: DISCONTINUED | OUTPATIENT
Start: 2024-07-04 | End: 2024-07-05 | Stop reason: HOSPADM

## 2024-07-04 RX ORDER — ALUMINA, MAGNESIA, AND SIMETHICONE 2400; 2400; 240 MG/30ML; MG/30ML; MG/30ML
30 SUSPENSION ORAL EVERY 6 HOURS PRN
Status: DISCONTINUED | OUTPATIENT
Start: 2024-07-04 | End: 2024-07-05 | Stop reason: HOSPADM

## 2024-07-04 RX ORDER — ACETAMINOPHEN 500 MG
1000 TABLET ORAL
Status: COMPLETED | OUTPATIENT
Start: 2024-07-04 | End: 2024-07-05

## 2024-07-04 RX ORDER — ONDANSETRON 2 MG/ML
4 INJECTION INTRAMUSCULAR; INTRAVENOUS EVERY 6 HOURS PRN
Status: DISCONTINUED | OUTPATIENT
Start: 2024-07-04 | End: 2024-07-05 | Stop reason: HOSPADM

## 2024-07-04 RX ORDER — ONDANSETRON 4 MG/1
4 TABLET, ORALLY DISINTEGRATING ORAL EVERY 6 HOURS PRN
Status: DISCONTINUED | OUTPATIENT
Start: 2024-07-04 | End: 2024-07-05 | Stop reason: HOSPADM

## 2024-07-04 RX ORDER — SODIUM CHLORIDE, SODIUM LACTATE, POTASSIUM CHLORIDE, AND CALCIUM CHLORIDE .6; .31; .03; .02 G/100ML; G/100ML; G/100ML; G/100ML
1000 INJECTION, SOLUTION INTRAVENOUS
Status: DISCONTINUED | OUTPATIENT
Start: 2024-07-04 | End: 2024-07-05 | Stop reason: HOSPADM

## 2024-07-04 RX ORDER — SODIUM CHLORIDE, SODIUM LACTATE, POTASSIUM CHLORIDE, AND CALCIUM CHLORIDE .6; .31; .03; .02 G/100ML; G/100ML; G/100ML; G/100ML
250 INJECTION, SOLUTION INTRAVENOUS PRN
Status: DISCONTINUED | OUTPATIENT
Start: 2024-07-04 | End: 2024-07-05 | Stop reason: HOSPADM

## 2024-07-04 RX ORDER — IBUPROFEN 800 MG/1
800 TABLET, FILM COATED ORAL
Status: COMPLETED | OUTPATIENT
Start: 2024-07-04 | End: 2024-07-05

## 2024-07-04 RX ORDER — CLINDAMYCIN PHOSPHATE 900 MG/50ML
900 INJECTION, SOLUTION INTRAVENOUS EVERY 8 HOURS
Status: DISCONTINUED | OUTPATIENT
Start: 2024-07-04 | End: 2024-07-05 | Stop reason: HOSPADM

## 2024-07-04 RX ORDER — ROPIVACAINE HYDROCHLORIDE 2 MG/ML
INJECTION, SOLUTION EPIDURAL; INFILTRATION; PERINEURAL CONTINUOUS
Status: DISCONTINUED | OUTPATIENT
Start: 2024-07-04 | End: 2024-07-07 | Stop reason: HOSPADM

## 2024-07-04 RX ORDER — SODIUM CHLORIDE, SODIUM LACTATE, POTASSIUM CHLORIDE, CALCIUM CHLORIDE 600; 310; 30; 20 MG/100ML; MG/100ML; MG/100ML; MG/100ML
INJECTION, SOLUTION INTRAVENOUS CONTINUOUS
Status: DISCONTINUED | OUTPATIENT
Start: 2024-07-04 | End: 2024-07-07 | Stop reason: HOSPADM

## 2024-07-04 RX ORDER — EPHEDRINE SULFATE 50 MG/ML
5 INJECTION, SOLUTION INTRAVENOUS
Status: DISCONTINUED | OUTPATIENT
Start: 2024-07-04 | End: 2024-07-05 | Stop reason: HOSPADM

## 2024-07-04 RX ORDER — MISOPROSTOL 200 UG/1
TABLET ORAL
Status: DISCONTINUED
Start: 2024-07-04 | End: 2024-07-04

## 2024-07-04 RX ORDER — TERBUTALINE SULFATE 1 MG/ML
0.25 INJECTION, SOLUTION SUBCUTANEOUS
Status: DISCONTINUED | OUTPATIENT
Start: 2024-07-04 | End: 2024-07-05 | Stop reason: HOSPADM

## 2024-07-04 RX ADMIN — SODIUM CHLORIDE, POTASSIUM CHLORIDE, SODIUM LACTATE AND CALCIUM CHLORIDE: 600; 310; 30; 20 INJECTION, SOLUTION INTRAVENOUS at 21:10

## 2024-07-04 RX ADMIN — CLINDAMYCIN IN 5 PERCENT DEXTROSE 900 MG: 18 INJECTION, SOLUTION INTRAVENOUS at 21:14

## 2024-07-04 ASSESSMENT — PATIENT HEALTH QUESTIONNAIRE - PHQ9
1. LITTLE INTEREST OR PLEASURE IN DOING THINGS: NOT AT ALL
SUM OF ALL RESPONSES TO PHQ9 QUESTIONS 1 AND 2: 0
2. FEELING DOWN, DEPRESSED, IRRITABLE, OR HOPELESS: NOT AT ALL

## 2024-07-04 ASSESSMENT — LIFESTYLE VARIABLES
EVER HAD A DRINK FIRST THING IN THE MORNING TO STEADY YOUR NERVES TO GET RID OF A HANGOVER: NO
TOTAL SCORE: 0
DOES PATIENT WANT TO STOP DRINKING: NO
AVERAGE NUMBER OF DAYS PER WEEK YOU HAVE A DRINK CONTAINING ALCOHOL: 0
EVER FELT BAD OR GUILTY ABOUT YOUR DRINKING: NO
HAVE PEOPLE ANNOYED YOU BY CRITICIZING YOUR DRINKING: NO
HOW MANY TIMES IN THE PAST YEAR HAVE YOU HAD 5 OR MORE DRINKS IN A DAY: 0
TOTAL SCORE: 0
ALCOHOL_USE: NO
EVER_SMOKED: YES
ON A TYPICAL DAY WHEN YOU DRINK ALCOHOL HOW MANY DRINKS DO YOU HAVE: 0
HAVE YOU EVER FELT YOU SHOULD CUT DOWN ON YOUR DRINKING: NO
TOTAL SCORE: 0
CONSUMPTION TOTAL: NEGATIVE

## 2024-07-04 ASSESSMENT — FIBROSIS 4 INDEX: FIB4 SCORE: 0.78

## 2024-07-05 LAB
ERYTHROCYTE [DISTWIDTH] IN BLOOD BY AUTOMATED COUNT: 42.2 FL (ref 35.9–50)
HCT VFR BLD AUTO: 34.1 % (ref 37–47)
HGB BLD-MCNC: 11 G/DL (ref 12–16)
HOLDING TUBE BB 8507: NORMAL
MCH RBC QN AUTO: 25.9 PG (ref 27–33)
MCHC RBC AUTO-ENTMCNC: 32.3 G/DL (ref 32.2–35.5)
MCV RBC AUTO: 80.4 FL (ref 81.4–97.8)
PLATELET # BLD AUTO: 200 K/UL (ref 164–446)
PMV BLD AUTO: 11.1 FL (ref 9–12.9)
RBC # BLD AUTO: 4.24 M/UL (ref 4.2–5.4)
WBC # BLD AUTO: 16.4 K/UL (ref 4.8–10.8)

## 2024-07-05 PROCEDURE — 304965 HCHG RECOVERY SERVICES

## 2024-07-05 PROCEDURE — 700105 HCHG RX REV CODE 258: Performed by: ADVANCED PRACTICE MIDWIFE

## 2024-07-05 PROCEDURE — 85027 COMPLETE CBC AUTOMATED: CPT

## 2024-07-05 PROCEDURE — A9270 NON-COVERED ITEM OR SERVICE: HCPCS | Performed by: ADVANCED PRACTICE MIDWIFE

## 2024-07-05 PROCEDURE — 770002 HCHG ROOM/CARE - OB PRIVATE (112)

## 2024-07-05 PROCEDURE — 36415 COLL VENOUS BLD VENIPUNCTURE: CPT

## 2024-07-05 PROCEDURE — 700102 HCHG RX REV CODE 250 W/ 637 OVERRIDE(OP): Performed by: ADVANCED PRACTICE MIDWIFE

## 2024-07-05 PROCEDURE — 700111 HCHG RX REV CODE 636 W/ 250 OVERRIDE (IP): Mod: JZ | Performed by: ADVANCED PRACTICE MIDWIFE

## 2024-07-05 PROCEDURE — 59410 OBSTETRICAL CARE: CPT | Performed by: ADVANCED PRACTICE MIDWIFE

## 2024-07-05 PROCEDURE — 59409 OBSTETRICAL CARE: CPT

## 2024-07-05 RX ORDER — OXYCODONE HCL 10 MG/1
10 TABLET, FILM COATED, EXTENDED RELEASE ORAL ONCE
Status: DISCONTINUED | OUTPATIENT
Start: 2024-07-05 | End: 2024-07-05 | Stop reason: CLARIF

## 2024-07-05 RX ORDER — DOCUSATE SODIUM 100 MG/1
100 CAPSULE, LIQUID FILLED ORAL 2 TIMES DAILY PRN
Status: DISCONTINUED | OUTPATIENT
Start: 2024-07-05 | End: 2024-07-07 | Stop reason: HOSPADM

## 2024-07-05 RX ORDER — IBUPROFEN 800 MG/1
800 TABLET, FILM COATED ORAL EVERY 8 HOURS PRN
Status: DISCONTINUED | OUTPATIENT
Start: 2024-07-05 | End: 2024-07-06

## 2024-07-05 RX ORDER — SODIUM CHLORIDE, SODIUM LACTATE, POTASSIUM CHLORIDE, CALCIUM CHLORIDE 600; 310; 30; 20 MG/100ML; MG/100ML; MG/100ML; MG/100ML
INJECTION, SOLUTION INTRAVENOUS PRN
Status: DISCONTINUED | OUTPATIENT
Start: 2024-07-05 | End: 2024-07-07 | Stop reason: HOSPADM

## 2024-07-05 RX ORDER — ACETAMINOPHEN 500 MG
1000 TABLET ORAL EVERY 6 HOURS PRN
Status: DISCONTINUED | OUTPATIENT
Start: 2024-07-05 | End: 2024-07-06

## 2024-07-05 RX ORDER — OXYCODONE HYDROCHLORIDE 10 MG/1
10 TABLET ORAL ONCE
Status: COMPLETED | OUTPATIENT
Start: 2024-07-05 | End: 2024-07-05

## 2024-07-05 RX ORDER — VITAMIN A ACETATE, BETA CAROTENE, ASCORBIC ACID, CHOLECALCIFEROL, .ALPHA.-TOCOPHEROL ACETATE, DL-, THIAMINE MONONITRATE, RIBOFLAVIN, NIACINAMIDE, PYRIDOXINE HYDROCHLORIDE, FOLIC ACID, CYANOCOBALAMIN, CALCIUM CARBONATE, FERROUS FUMARATE, ZINC OXIDE, CUPRIC OXIDE 3080; 12; 120; 400; 1; 1.84; 3; 20; 22; 920; 25; 200; 27; 10; 2 [IU]/1; UG/1; MG/1; [IU]/1; MG/1; MG/1; MG/1; MG/1; MG/1; [IU]/1; MG/1; MG/1; MG/1; MG/1; MG/1
1 TABLET, FILM COATED ORAL
Status: DISCONTINUED | OUTPATIENT
Start: 2024-07-05 | End: 2024-07-07 | Stop reason: HOSPADM

## 2024-07-05 RX ADMIN — IBUPROFEN 800 MG: 800 TABLET, FILM COATED ORAL at 02:02

## 2024-07-05 RX ADMIN — ACETAMINOPHEN 1000 MG: 500 TABLET ORAL at 08:06

## 2024-07-05 RX ADMIN — OXYCODONE HYDROCHLORIDE 10 MG: 10 TABLET ORAL at 06:28

## 2024-07-05 RX ADMIN — OXYTOCIN 125 ML/HR: 10 INJECTION, SOLUTION INTRAMUSCULAR; INTRAVENOUS at 04:22

## 2024-07-05 RX ADMIN — IBUPROFEN 800 MG: 800 TABLET, FILM COATED ORAL at 12:31

## 2024-07-05 RX ADMIN — PRENATAL WITH FERROUS FUM AND FOLIC ACID 1 TABLET: 3080; 920; 120; 400; 22; 1.84; 3; 20; 10; 1; 12; 200; 27; 25; 2 TABLET ORAL at 08:07

## 2024-07-05 RX ADMIN — ACETAMINOPHEN 1000 MG: 500 TABLET ORAL at 20:05

## 2024-07-05 RX ADMIN — OXYTOCIN 20 UNITS: 10 INJECTION, SOLUTION INTRAMUSCULAR; INTRAVENOUS at 01:22

## 2024-07-05 RX ADMIN — ACETAMINOPHEN 1000 MG: 500 TABLET ORAL at 02:02

## 2024-07-05 RX ADMIN — OXYTOCIN 125 ML/HR: 10 INJECTION, SOLUTION INTRAMUSCULAR; INTRAVENOUS at 02:07

## 2024-07-05 RX ADMIN — IBUPROFEN 800 MG: 800 TABLET, FILM COATED ORAL at 20:05

## 2024-07-05 ASSESSMENT — PAIN DESCRIPTION - PAIN TYPE
TYPE: ACUTE PAIN

## 2024-07-05 NOTE — ANESTHESIA PREPROCEDURE EVALUATION
Date: 07/04/24  Procedure: Labor Epidural         Relevant Problems   No relevant active problems       Physical Exam    Airway   Mallampati: II  TM distance: >3 FB  Neck ROM: full       Cardiovascular - normal exam  Rhythm: regular  Rate: normal  (-) murmur     Dental - normal exam           Pulmonary - normal exam  Breath sounds clear to auscultation     Abdominal    Neurological - normal exam                   Anesthesia Plan    ASA 2       Plan - epidural   Neuraxial block will be labor analgesia                  Pertinent diagnostic labs and testing reviewed    Informed Consent:    Anesthetic plan and risks discussed with patient.          
98.1

## 2024-07-05 NOTE — PROGRESS NOTES
"32-45\" + 5004\" abdominal binders sent to tube station 22, RN notified.     Contact traction for any questions or concerns.   "

## 2024-07-05 NOTE — L&D DELIVERY NOTE
Admit Date:   2024      Pregnancy Complications: late prenatal care, GBS positive, elevated 1hr but no additional testing  Medical Induction of Labor: no  GBS: positive  Anesthesia: epidural  Gestational Age at delivery: 37.5 weeks    Patient  progressed well with after membrane sweep and AROM. She complained of pressure and started with spontaneous pushing efforts. She pushed well to deliver viable male infant in NASEEM position at 0117 with coached pushing efforts. Nuchal x1 reduced via Somersault maneuver and infant placed to maternal abdomen where he was dried and stimulated. A spontaneous cry was noted. Apgar 8, 9. However, RT requested more immediate evaluation of infant related to color and thus cord was clamped and cut. Infant then taken to warmer.       Pitocin infused via IV bolus. Signs of placenta separation noted and gentle cord traction was completed. Intact placenta was delivered spontaneously at 0126 where 3VC was noted. EBL 250ml. Fundus firm with minimal massage. Inspection of vulva and vagina was completed and no lacerations were noted. Routine postpartum care was initiated as mother and infant stable. Infant weight is 3690g    Delivery Complications: none    Clarke ECHEVERRIA CNM/ Dr. Johnson, Attending

## 2024-07-05 NOTE — PROGRESS NOTES
2055 Report received from SHAHIDA Vázquez. CHULA discussed, questions answered. Transferred from LDA2 to S220. 2210 Report given to SHAHIDA Sanchez. CHULA discussed, questions answered.

## 2024-07-05 NOTE — PROGRESS NOTES
34 y.o.  EDC  (37.4 wks), GBS positive    Pt presents to L&D c/o regular contractions throughout the day today. However, pt states when she got into the hospital, her ctxs stopped. Reports good FM. Denies VB/LOF. Pt with poly EFM/TOCO applied, VSS. SVE 4/60/-4.    : Roni BENJAMIN notified of pt's arrival and current status.    : Roni BENJAMIN at bedside discussing POC with pt. Provider will return to [facility] pt in 30 minutes.    : SVE 5-6/70/-3 by Roni BENJAMIN. Admission orders received.    : Report to Deysi PINEDO.

## 2024-07-05 NOTE — H&P
"Admission History and Physical      Toña Turner is a 34 y.o. female  at 37w4d who presents for abdominal pain    Subjective:   positive  For CTXS.   positive Feels pain   negative for LOF  negative for vaginal bleeding.   positive for fetal movement    She reports she has walked most of the day and noticed increasing frequency of contractions. Of note, she has been followed in clinic for suspected macrosomia and polyhydramnios. Had NST and BPP yesterday that was 8/10 related to variable decels on NST. LAMBERTO yesterday 28.54 completed by myself.     ROS: Pertinent items are noted in HPI.    No past medical history on file.  Past Surgical History:   Procedure Laterality Date    OTHER ABDOMINAL SURGERY      appendectomy    OTHER      tonselectomy     OB History    Para Term  AB Living   10 6 5   3 6   SAB IAB Ectopic Molar Multiple Live Births   3       0 6      # Outcome Date GA Lbr John/2nd Weight Sex Delivery Anes PTL Lv   10 Current            9 Para 10/28/19  / 00:01 2.645 kg (5 lb 13.3 oz) F Vag-Spont None N MACHO   8 Term 13 38w0d  3.289 kg (7 lb 4 oz) M Vag-Spont   MACHO   7 Term 12 38w0d  2.608 kg (5 lb 12 oz) M Vag-Spont   MACHO      Birth Comments: \"Baby delivered face up with his cord wrapped around his neck and tied in knots\",   6 Term 09 38w0d  2.835 kg (6 lb 4 oz) F Vag-Spont   MACHO   5 Term 08 38w0d  2.835 kg (6 lb 4 oz) F Vag-Spont   MACHO   4 Term 10/19/06 38w0d  2.58 kg (5 lb 11 oz) M Vag-Spont   MACHO   3 SAB            2 SAB            1 SAB              Social History     Socioeconomic History    Marital status:      Spouse name: Not on file    Number of children: Not on file    Years of education: Not on file    Highest education level: Not on file   Occupational History    Not on file   Tobacco Use    Smoking status: Former     Current packs/day: 0.00     Types: Cigarettes     Quit date: 2012     Years since quittin.2    Smokeless " tobacco: Never   Vaping Use    Vaping status: Never Used   Substance and Sexual Activity    Alcohol use: Not Currently     Comment: Rare Occ    Drug use: Yes     Types: Marijuana     Comment: marijuana edibles    Sexual activity: Yes     Partners: Male     Birth control/protection: None, Condom   Other Topics Concern    Not on file   Social History Narrative    Not on file     Social Determinants of Health     Financial Resource Strain: Not on file   Food Insecurity: Not on file   Transportation Needs: Not on file   Physical Activity: Not on file   Stress: Not on file   Social Connections: Not on file   Intimate Partner Violence: Not on file   Housing Stability: Not on file     Allergies: Amoxicillin, Codeine, Latex, Pcn [penicillins], and Vicodin [hydrocodone-acetaminophen]    Current Facility-Administered Medications:     LR infusion, , Intravenous, Continuous, Carrissia Timothy, OGNP    lidocaine (XYLOCAINE) 1%  injection, 20 mL, Subcutaneous, Once PRN, Carrissia Timothy, OGNP    terbutaline (Brethine) injection 0.25 mg, 0.25 mg, Subcutaneous, Once PRN, Carrissia Timothy, OGNP    oxytocin (Pitocin) infusion bolus (for post delivery), 20 Units, Intravenous, Once **FOLLOWED BY** oxytocin (Pitocin) infusion (for post delivery), 125 mL/hr, Intravenous, Continuous, Carrissia Timothy, OGNP    oxytocin (Pitocin) injection 10 Units, 10 Units, Intramuscular, Once PRN, Carrissia Timothy, OGNP    ibuprofen (Motrin) tablet 800 mg, 800 mg, Oral, Once PRN, Carrissia Timothy, OGNP    acetaminophen (Tylenol) tablet 1,000 mg, 1,000 mg, Oral, Once PRN, Carrissia Timothy, OGNP    fentaNYL (Sublimaze) injection 50 mcg, 50 mcg, Intravenous, Q HOUR PRN **OR** fentaNYL (Sublimaze) injection 100 mcg, 100 mcg, Intravenous, Q HOUR PRN, Carrissia Timothy, OGNP    oxytocin (Pitocin) infusion (for induction), 0.5-20 cielo-units/min, Intravenous, Continuous, Carrissia Timothy, OGNP    ondansetron (Zofran ODT) dispertab 4 mg, 4 mg, Oral, Q6HRS  PRN **OR** ondansetron (Zofran) syringe/vial injection 4 mg, 4 mg, Intravenous, Q6HRS PRN, JAY Dodd    clindamycin (Cleocin) IVPB premix 900 mg, 900 mg, Intravenous, Q8HRS, JAY Dodd    mag hydrox-al hydrox-simeth (Maalox Plus Es Or Mylanta Ds) suspension 30 mL, 30 mL, Oral, Q6HRS PRN, JAY Dodd    Prenatal care with [facility] starting at 24 weeks with following problems:  Patient Active Problem List    Diagnosis Date Noted    Labor and delivery indication for care or intervention 07/04/2024    S>D 06/21/2024    Request for sterilization 06/04/2024    Advanced maternal age in multigravida 03/25/2024    Late prenatal care @ 24 weeks 03/25/2024    Grand multipara 03/25/2024    Marijuana use 03/25/2024    History of seizures 03/25/2024    History of postpartum hemorrhage 03/25/2024       Last US at 36 weeks  6/20/2024 10:46 AM     HISTORY/REASON FOR EXAM:  AMA; growth in 2 weeks.     TECHNIQUE/EXAM DESCRIPTION: OB limited ultrasound.     COMPARISON:  4/2/2024 and 4/24/2024 obstetrical ultrasounds     FINDINGS:  Fetal Lie:  Vertex  LMP:  10/8/2023  Clinical SNEHAL by LMP:  7/14/2024     Placenta (Location):  Posterior/fundal  Placenta Previa: No  Placental rdGrdrrdarddrderd:rd rd3rd Amniotic Fluid Volume:  LAMBERTO = 19.0 cm     Fetal Heart Rate:  126 bpm     Cervical Length:  Not seen     No maternal adnexal mass is identified.     Fetal Biometry  BPD    9.39 cm, 38 weeks 2 days, (98.2%)  HC    34.30 cm, 39 weeks 4 days, (96.1%)  AC    35.38 cm, 39 weeks 2 days, (Greater than 99%)  Femur Length    6.92 cm, 35 weeks 4 days, (42.5%)  Humerus Length    6.12 cm, 35 weeks 3 days, (66.8%)  Cerebellum Diameter   5.36 cm, 37 weeks 5 days, (91.0%)     EGA by this US:  38 weeks 1 day  SNEHAL by this US: 7/3/2024  SNEHAL by 1st US:  7/21/2024 (working)     Estimated Fetal Weight:  3475 grams  EFW Percentile: 98.2%     Comments:     IMPRESSION:     Single intrauterine pregnancy of an estimated gestational age of 38 weeks  "1 day with an estimated date of delivery of 7/3/2024.     Estimated fetal weight and abdominal circumference are above standard deviation as listed above. Sizes greater than dates. Otherwise no fetal anatomic abnormalities are noted on this limited exam.        Objective:      /72   Pulse (!) 103   Temp 36.4 °C (97.5 °F) (Temporal)   Resp 18   Ht 1.575 m (5' 2\")   Wt 80.3 kg (177 lb)   SpO2 96%     General:   no acute distress, alert   Skin:   normal   HEENT:  PERRLA   Lungs:   CTA bilateral   Heart:   S1, S2 normal, no murmur, click, rub or gallop, regular rate and rhythm, peripheral pulses very brisk, chest is clear without rales or wheezing, no pedal edema, no JVD, no hepatosplenomegaly   Abdomen:   gravid, NT   EFW:  3600g   Pelvis:  adequate, diagnonal conjugate not met   FHT:  140 BPM   Uterine Size: S>D   Presentations: Cephalic   Cervix:     Dilation: 5-6    Effacement: 70    Station:  -3    Consistency: Soft    Position: Middle     Lab Review  Lab:   Blood type: O     Recent Results (from the past 5880 hour(s))   EKG    Collection Time: 23 12:12 PM   Result Value Ref Range    Report       [facility]    Test Date:  2023  Pt Name:    RANDA TUCKER                  Department: ER  MRN:        [id number]                      Room:  Gender:     Female                       Technician: [id number]  :        [id number]                   Requested By:ER TRIAGE PROTOCOL  Order #:    [id number]                    Reading MD: ANJELICA GUILLORY D.O.    Measurements  Intervals                                Axis  Rate:       83                           P:          44  ID:         150                          QRS:        41  QRSD:       95                           T:          26  QT:         357  QTc:        420    Interpretive Statements  Sinus rhythm  No previous ECG available for comparison  Electronically Signed On 2023 17:33:20 PST by ANJELICA GUILLORY, " OPAL.O.     CBC WITH DIFFERENTIAL    Collection Time: 12/13/23 12:26 PM   Result Value Ref Range    WBC 6.7 4.8 - 10.8 K/uL    RBC 4.50 4.20 - 5.40 M/uL    Hemoglobin 13.4 12.0 - 16.0 g/dL    Hematocrit 38.2 37.0 - 47.0 %    MCV 84.9 81.4 - 97.8 fL    MCH 29.8 27.0 - 33.0 pg    MCHC 35.1 32.2 - 35.5 g/dL    RDW 37.9 35.9 - 50.0 fL    Platelet Count 145 (L) 164 - 446 K/uL    MPV 11.4 9.0 - 12.9 fL    Neutrophils-Polys 90.00 (H) 44.00 - 72.00 %    Lymphocytes 3.00 (L) 22.00 - 41.00 %    Monocytes 6.10 0.00 - 13.40 %    Eosinophils 0.40 0.00 - 6.90 %    Basophils 0.10 0.00 - 1.80 %    Immature Granulocytes 0.40 0.00 - 0.90 %    Nucleated RBC 0.00 0.00 - 0.20 /100 WBC    Neutrophils (Absolute) 6.02 1.82 - 7.42 K/uL    Lymphs (Absolute) 0.20 (L) 1.00 - 4.80 K/uL    Monos (Absolute) 0.41 0.00 - 0.85 K/uL    Eos (Absolute) 0.03 0.00 - 0.51 K/uL    Baso (Absolute) 0.01 0.00 - 0.12 K/uL    Immature Granulocytes (abs) 0.03 0.00 - 0.11 K/uL    NRBC (Absolute) 0.00 K/uL   COMP METABOLIC PANEL    Collection Time: 12/13/23 12:26 PM   Result Value Ref Range    Sodium 134 (L) 135 - 145 mmol/L    Potassium 3.2 (L) 3.6 - 5.5 mmol/L    Chloride 101 96 - 112 mmol/L    Co2 18 (L) 20 - 33 mmol/L    Anion Gap 15.0 7.0 - 16.0    Glucose 93 65 - 99 mg/dL    Bun 10 8 - 22 mg/dL    Creatinine 0.55 0.50 - 1.40 mg/dL    Calcium 9.4 8.5 - 10.5 mg/dL    Correct Calcium 9.3 8.5 - 10.5 mg/dL    AST(SGOT) 16 12 - 45 U/L    ALT(SGPT) 20 2 - 50 U/L    Alkaline Phosphatase 43 30 - 99 U/L    Total Bilirubin 0.4 0.1 - 1.5 mg/dL    Albumin 4.1 3.2 - 4.9 g/dL    Total Protein 6.8 6.0 - 8.2 g/dL    Globulin 2.7 1.9 - 3.5 g/dL    A-G Ratio 1.5 g/dL   LIPASE    Collection Time: 12/13/23 12:26 PM   Result Value Ref Range    Lipase 25 11 - 82 U/L   HCG QUANTITATIVE    Collection Time: 12/13/23 12:26 PM   Result Value Ref Range    [facility] [place] [address].0 (H) 0.0 - 5.0 mIU/mL   RH Type for Rhogam from E.D.    Collection Time: 12/13/23 12:26 PM   Result Value Ref Range     Emergency Department Rh Typing POS     Number Of Rh Doses Indicated ZERO    ESTIMATED GFR    Collection Time: 12/13/23 12:26 PM   Result Value Ref Range    GFR (CKD-EPI) 122 >60 mL/min/1.73 m 2   URINALYSIS,CULTURE IF INDICATED    Collection Time: 12/13/23  2:30 PM    Specimen: Urine   Result Value Ref Range    Color DK Yellow     Character Clear     Specific Gravity 1.034 <1.035    Ph 7.5 5.0 - 8.0    Glucose Negative Negative mg/dL    Ketones >=160 Negative mg/dL    Protein 30 (A) Negative mg/dL    Bilirubin Negative Negative    Urobilinogen, Urine 1.0 Negative    Nitrite Negative Negative    Leukocyte Esterase Negative Negative    Occult Blood Negative Negative    Micro Urine Req Microscopic    URINE MICROSCOPIC (W/UA)    Collection Time: 12/13/23  2:30 PM   Result Value Ref Range    WBC 0-2 /hpf    RBC 0-2 /hpf    Bacteria Negative None /hpf    Epithelial Cells Few /hpf    Mucous Threads Few /hpf    Hyaline Cast 0-2 /lpf   URINE DRUG SCREEN W/CONF (AR)    Collection Time: 03/25/24 10:39 AM   Result Value Ref Range    Urine Amphetamine-Methamphetam Negative Cutoff 300 ng/mL    Barbiturates Negative Cutoff 200 ng/mL    Benzodiazepines Negative Cutoff 200 ng/mL    Propoxyphene Negative Cutoff 300 ng/mL    Cocaine Metabolite Negative Cutoff 150 ng/mL    Methadone Negative Cutoff 150 ng/mL    Codeine-Morphine Negative Cutoff 300 ng/mL    Phencyclidine -Pcp Negative Cutoff 25 ng/mL    Cannabinoid Metab PresumptivePOS Cutoff 50 ng/mL    Creatinine Urine 121.5 20.0 - 400.0 mg/dL    Drug Comment Urine Drugs See Note    PREG CNTR PRENATAL PN    Collection Time: 03/25/24 10:39 AM   Result Value Ref Range    WBC 8.8 4.8 - 10.8 K/uL    RBC 4.29 4.20 - 5.40 M/uL    Hemoglobin 13.0 12.0 - 16.0 g/dL    Hematocrit 38.6 37.0 - 47.0 %    MCV 90.0 81.4 - 97.8 fL    MCH 30.3 27.0 - 33.0 pg    MCHC 33.7 32.2 - 35.5 g/dL    RDW 45.7 35.9 - 50.0 fL    Platelet Count 183 164 - 446 K/uL    MPV 10.8 9.0 - 12.9 fL    Hepatitis C Antibody  Non-Reactive Non-Reactive    Hepatitis B Surface Antigen Non-Reactive Non-Reactive    Rubella IgG Antibody 25.40 IU/mL    Syphilis, Treponemal Qual Non-Reactive Non-Reactive   URINE CULTURE(NEW)    Collection Time: 03/25/24 10:39 AM    Specimen: Urine   Result Value Ref Range    Significant Indicator NEG     Source UR     Site Clean Catch     Culture Result No growth at 48 hours.    HIV AG/AB COMBO ASSAY SCREENING    Collection Time: 03/25/24 10:39 AM   Result Value Ref Range    HIV Ag/Ab Combo Assay Non-Reactive Non Reactive   OP Prenatal Panel-Blood Bank    Collection Time: 03/25/24 10:39 AM   Result Value Ref Range    ABO Grouping Only O     Rh Grouping Only POS     Antibody Screen Scrn NEG    CANNABINOIDS, UR CONFIRM    Collection Time: 03/25/24 10:39 AM   Result Value Ref Range    Cannabinoids, Ur Drug Confirmation >500 ng/mL   THINPREP PAP W/HPV AND CTNG    Collection Time: 03/25/24 10:42 AM   Result Value Ref Range    DIAGNOSIS: SEE BELOW     Specimen adequacy: SEE BELOW     Performed by: SEE BELOW     Comment Comment     Note: SEE BELOW     Test Methodology: SEE BELOW     HPV Aptima Negative Negative    Chlamydia, Nuc. Acid Amp Negative Negative    Gonococcus, Nuc. Acid Amp Negative Negative   VAGINAL PATHOGENS DNA PANEL    Collection Time: 03/25/24 10:42 AM   Result Value Ref Range    Candida species DNA Probe Negative Negative    Trichamonas vaginalis DNA Probe Negative Negative    Gardnerella vaginalis DNA Probe Negative Negative   GLUCOSE 1HR GESTATIONAL    Collection Time: 06/27/24  9:50 AM   Result Value Ref Range    Glucose, Post Dose 187 (H) 70 - 139 mg/dL   T.PALLIDUM AB JUSTO (SCREENING)    Collection Time: 06/27/24  9:51 AM   Result Value Ref Range    Syphilis, Treponemal Qual Non-Reactive Non-Reactive   CBC WITHOUT DIFFERENTIAL    Collection Time: 06/27/24  9:51 AM   Result Value Ref Range    WBC 9.7 4.8 - 10.8 K/uL    RBC 4.20 4.20 - 5.40 M/uL    Hemoglobin 11.2 (L) 12.0 - 16.0 g/dL    Hematocrit  35.2 (L) 37.0 - 47.0 %    MCV 83.8 81.4 - 97.8 fL    MCH 26.7 (L) 27.0 - 33.0 pg    MCHC 31.8 (L) 32.2 - 35.5 g/dL    RDW 43.8 35.9 - 50.0 fL    Platelet Count 165 164 - 446 K/uL    MPV 11.5 9.0 - 12.9 fL   GRP B STREP, BY PCR (AGUILAR BROTH)    Collection Time: 06/27/24  2:40 PM    Specimen: Genital   Result Value Ref Range    Strep Gp B DNA PCR POSITIVE (A) Negative   UROGENITAL BETA STREP (GP.B)    Collection Time: 06/27/24  2:40 PM    Specimen: Genital   Result Value Ref Range    Significant Indicator NEG     Source GEN     Site -     Culture Result       Unable to recover Streptococcus agalactiae(Group B)   HEMOGLOBIN A1C    Collection Time: 07/03/24 11:39 AM   Result Value Ref Range    Glycohemoglobin 5.5 4.0 - 5.6 %    Est Avg Glucose 111 mg/dL          Assessment:   Toña Turner at 37w4d  Labor status: Active phase labor., Inadequate uterine activity - intensity or frequency., and Satisfactory labor progress.  Obstetrical history significant for   Patient Active Problem List    Diagnosis Date Noted    Labor and delivery indication for care or intervention 07/04/2024    S>D 06/21/2024    Request for sterilization 06/04/2024    Advanced maternal age in multigravida 03/25/2024    Late prenatal care @ 24 weeks 03/25/2024    Grand multipara 03/25/2024    Marijuana use 03/25/2024    History of seizures 03/25/2024    History of postpartum hemorrhage 03/25/2024   .      Plan:     1. Admit to L&D  2. GBS positive but with no susceptibilities. Did have negative and positive swabs 7 days ago. Start clindamycin due to PCN allergy  3. Desires epidural for pain relief.    4. Plan at this time is expectant management . Consider pitocin for augmentation if appropriate.   5. No GDM testing. A1C yesterday 5.5 and patient had not started checking blood sugars. CMP added today and plan for infant to be checked. Patient aware of increased risks associated with undiagnosed gestational diabetes especially in presence of  macrosomia.       CFeaster CASSIDY/ Dr. Johnson Attending

## 2024-07-05 NOTE — ED PROVIDER NOTES
"Emergency Obstetric Consultation     Date of Service  2024    Reason for Consultation  Chief Complaint   Patient presents with    Contractions       History of Presenting Illness  34 y.o. female who presented 2024 with Reason for consult: contractionsContractions: Onset was 13-24 hours ago.    Frequency is approximately 7 minutes.  Perceived severity is moderate.    Fetal activity: Perceived fetal activity is normal.    Last perceived fetal movement was within the past hour.    Prenatal complications: Late prenatal care, elevated 1 hr and no subsequent 3hr, grandmultiparity    .    Review of Systems  Review of Systems   All other systems reviewed and are negative.      Obstetric History    OB History    Para Term  AB Living   10 6 5   3 6   SAB IAB Ectopic Molar Multiple Live Births   3       0 6      # Outcome Date GA Lbr John/2nd Weight Sex Delivery Anes PTL Lv   10 Current            9 Para 10/28/19  / 00:01 2.645 kg (5 lb 13.3 oz) F Vag-Spont None N MACHO   8 Term 13 38w0d  3.289 kg (7 lb 4 oz) M Vag-Spont   MACHO   7 Term 12 38w0d  2.608 kg (5 lb 12 oz) M Vag-Spont   MACHO      Birth Comments: \"Baby delivered face up with his cord wrapped around his neck and tied in knots\",   6 Term 09 38w0d  2.835 kg (6 lb 4 oz) F Vag-Spont   MACHO   5 Term 08 38w0d  2.835 kg (6 lb 4 oz) F Vag-Spont   MACHO   4 Term 10/19/06 38w0d  2.58 kg (5 lb 11 oz) M Vag-Spont   MACHO   3 SAB            2 SAB            1 SAB                Gynecologic History  Patient's last menstrual period was 10/08/2023 (exact date).    Medical History  No past medical history on file.    Surgical History   has a past surgical history that includes other () and other abdominal surgery ().    Family History  family history includes Cancer in her mother; Diabetes in her mother; No Known Problems in her father.    Social History   reports that she quit smoking about 12 years ago. Her smoking use included " cigarettes. She has never used smokeless tobacco. She reports that she does not currently use alcohol. She reports current drug use. Drug: Marijuana.    Medications  Medications Prior to Admission   Medication Sig Dispense Refill Last Dose    Blood Glucose Meter Kit Test blood sugar as recommended by provider. Please dispense 1 monitor per patient formulary 1 Kit 0     Blood Glucose Test Strips Use one strip to test blood sugar  Strip 0     Lancets Use one lancet to test blood sugar  Each 3     Alcohol Swabs Wipe site with prep pad prior to injection. 100 Each 0     prenatal plus vitamin (STUARTNATAL 1+1) 27-1 MG Tab tablet Take 1 Tab by mouth every morning. (Patient not taking: Reported on 6/27/2024) 30 Tab 3        Allergies  Allergies   Allergen Reactions    Amoxicillin Rash     Hives      Codeine Hives    Latex Hives    Pcn [Penicillins] Rash    Vicodin [Hydrocodone-Acetaminophen] Rash       Physical Exam  Maternal Exam:  Uterine Assessment: Contraction strength is mild.  Contraction frequency is irregular.   Abdomen: Patient reports generalized tenderness.   Estimated fetal weight is 3700.    Fetal presentation: vertex  Introitus: Normal vulva. Normal vagina.  Pelvis: adequate for delivery.    Cervix: Cervix evaluated by digital exam.    Per RN 4/60/-3Mode: ultrasound.    Baseline rate: 135.   Variability: moderate (6-25 bpm).    Fetal State Assessment: Category I - tracings are normal.  Skin:     General: Skin is warm and dry.   HENT:      Head: Normocephalic and atraumatic.   Cardiovascular:      Rate and Rhythm: Normal rate.   Musculoskeletal:         General: Normal range of motion.   Abdominal:      Palpations: Abdomen is soft.      Tenderness: There is generalized abdominal tenderness.   Constitutional:       Appearance: Normal appearance.   Pulmonary:      Effort: Pulmonary effort is normal.   Psychiatric:         Mood and Affect: Mood normal.         Behavior: Behavior normal.  "  Neurological:      Mental Status: She is alert.   Genitourinary:     General: Normal vulva.         Laboratory               No results for input(s): \"NTPROBNP\" in the last 72 hours.           Assessment & Plan  Assessment:  Dysfunctional uterine contractions.   Membrane status: intact.   Fetal well-being: normal.   1. 35 yo   2. Cat I FHR tracing  3. Polyhydramnios  4. Grandmultiparity  5. AMA  6. Suspected Macrosomia    Plan:  1. Discussed labor evaluation and repeat cervical exam to determine if in labor.             JAY Dodd    "

## 2024-07-05 NOTE — PROGRESS NOTES
2210: Bedside report received from SHAHIDA Tai.    2320: Tamayo to bedside. AROM. SVE 7/70/-2.    0110: SVE: 9.5/100/+1.    0117: Delivery of viable male infant.    0345: Fundus firm, bleeding light. Pt up to bathroom and able to void. Transported to postpartum unit via wheelchair with infant in arms. Bands verified. Bedside report given to SHAHIDA Aguirre.

## 2024-07-05 NOTE — PROGRESS NOTES
"S: Pt is in hands and knees upon entry to room. Reports continued low back with her contractions.  is at bedside with RN. Discussed with patient controlled AROM at this time. Risks/benefits were reviewed with patient and specifically the ability to ensure that fetal head is well applied in light of polyhydramnios.      O:    Vitals:    07/04/24 1936   BP: 125/72   Pulse: (!) 103   Resp: 18   Temp: 36.4 °C (97.5 °F)   TempSrc: Temporal   SpO2: 96%   Weight: 80.3 kg (177 lb)   Height: 1.575 m (5' 2\")           FHTs:  Baseline 140, pos accels, no decels, moderate variability        [place]: Contractions q 2-3 minutes, strong to palpation,        SVE: 6/70/-2 at peak of contraction, AROM completed with copious amounts of clear fluid returned. Hand kept in vagina and additional fluid was noted. Head remained well applied to cervix.  Subsequent SVE 7/70/-2    A/P:    1.  IUP @ 37w5d   2.  Cat I FHTs   3.  Early Labor - expectant management at this time. Consider AROM if appropriate. Place abdominal binder to help with fetal positioning.   4.  GBS positive- continue clindamycin per protocol.    JUWAN Dodd, CASSIDY    "

## 2024-07-05 NOTE — CARE PLAN
The patient is Stable - Low risk of patient condition declining or worsening    Shift Goals  Clinical Goals:   Patient Goals: Healthy mom, healthy baby  Family Goals: Support    Progress made toward(s) clinical / shift goals:  Progressing    Problem: Knowledge Deficit - L&D  Goal: Patient and family/caregivers will demonstrate understanding of plan of care, disease process/condition, diagnostic tests and medications  Outcome: Progressing  Note: Pt questions and concerns answered. Will continue to include pt in POC and educate as necessary.     Problem: Risk for Injury  Goal: Patient and fetus will be free of preventable injury/complications  Outcome: Progressing  Note: Continuous external and internal monitoring in place.

## 2024-07-05 NOTE — DISCHARGE PLANNING
Discharge Planning Assessment Post Partum    Reason for Referral: History of THC-baby's UDS is positive   Address: [address] [place] [address] D [place] [address]  Phone: [phone]  Type of Living Situation: stable housing   Mom Diagnosis: Pregnancy, vaginal delivery   Baby Diagnosis: -37.5 weeks, infant currently in the NBN for aj and periodic breathing   Primary Language: English     Name of Baby: Jarek Landeros (: 24)  Father of the Baby: Tee Landeros (: 86)  Involved in baby’s care? Yes  Contact Information: [phone]    Prenatal Care: Yes-Dr. Johnson   Mom's PCP: No PCP listed   PCP for new baby: Pediatrician list provided     Support System: FOB and family   Coping/Bonding between mother & baby: Yes  Source of Feeding: formula   Supplies for Infant: prepared for infant     Mom's Insurance: Medicaid FFS  Baby Covered on Insurance:Yes  Mother Employed/School: Not currently   Other children in the home/names & ages: MOB has seven children:  11 year old son-Roberto Cain (12) and 10 year old son-Sailor Cain (13).  MOB has 3 other children that live with their father in [place]; Ghassan-18, Raylynn-16, and Jaydi-15     Financial Hardship/Income: No   Mom's Mental status: alert and oriented   Services used prior to admit: Medicaid and food stamps     CPS History: [facility] ([phone]) to report positive drug screen.   Psychiatric History: No  Domestic Violence History: No  Drug/ETOH History: history of THC.  Infant's UDS is positive for marijuana.  Report called in to DCFS.    Resources Provided: pediatrician list, children and family resource list, post partum support and counseling resources, diaper bank assistance resources, and list of [facility] clinics provided   Referrals Made: diaper bank referrals provided      Clearance for Discharge: Message left for [facility] to report infant's positive THC drug screen.  Infant will be cleared to discharge  home with parents once medically cleared

## 2024-07-05 NOTE — DISCHARGE PLANNING
:    Received call back from Moni Real with [facility].  Report made to Moni which is information only.    Plan:  Infant is cleared to discharge home with parents once medically cleared.

## 2024-07-05 NOTE — PROGRESS NOTES
0345- Patient transferred from labor and delivery in wheelchair with infant in arms and FOB accompanying with belongings. Two RN verification of infant and parent armbands. Report received from Laura RN , L&D RN. Patient oriented to unit and POC including, EPDS, BC, I&O chart, feeding frequencies, safe sleeping practices, and emergency cord use. Assessment completed, fundus firm at umbilicus and palpable, lochia light rubra. Patient has already voided/a delong catheter in place and is doing so without problems/with adequate output. Pain management discussed. Pitocin infusing at 125 ml/hr. IV patent, no s/s of infiltration at insertion site. Patient encouraged to call with needs.

## 2024-07-06 PROCEDURE — 700102 HCHG RX REV CODE 250 W/ 637 OVERRIDE(OP): Performed by: NURSE PRACTITIONER

## 2024-07-06 PROCEDURE — A9270 NON-COVERED ITEM OR SERVICE: HCPCS | Performed by: ADVANCED PRACTICE MIDWIFE

## 2024-07-06 PROCEDURE — 700102 HCHG RX REV CODE 250 W/ 637 OVERRIDE(OP): Performed by: ADVANCED PRACTICE MIDWIFE

## 2024-07-06 PROCEDURE — 770002 HCHG ROOM/CARE - OB PRIVATE (112)

## 2024-07-06 PROCEDURE — A9270 NON-COVERED ITEM OR SERVICE: HCPCS | Performed by: NURSE PRACTITIONER

## 2024-07-06 RX ORDER — IBUPROFEN 800 MG/1
800 TABLET, FILM COATED ORAL EVERY 8 HOURS
Status: DISCONTINUED | OUTPATIENT
Start: 2024-07-06 | End: 2024-07-07 | Stop reason: HOSPADM

## 2024-07-06 RX ORDER — OXYCODONE HYDROCHLORIDE 10 MG/1
10 TABLET ORAL ONCE
Status: COMPLETED | OUTPATIENT
Start: 2024-07-06 | End: 2024-07-06

## 2024-07-06 RX ORDER — ACETAMINOPHEN 500 MG
1000 TABLET ORAL EVERY 6 HOURS
Status: DISCONTINUED | OUTPATIENT
Start: 2024-07-06 | End: 2024-07-07 | Stop reason: HOSPADM

## 2024-07-06 RX ADMIN — ACETAMINOPHEN 1000 MG: 500 TABLET ORAL at 21:35

## 2024-07-06 RX ADMIN — IBUPROFEN 800 MG: 800 TABLET, FILM COATED ORAL at 15:31

## 2024-07-06 RX ADMIN — DOCUSATE SODIUM 100 MG: 100 CAPSULE, LIQUID FILLED ORAL at 08:09

## 2024-07-06 RX ADMIN — ACETAMINOPHEN 1000 MG: 500 TABLET ORAL at 13:40

## 2024-07-06 RX ADMIN — OXYCODONE HYDROCHLORIDE 10 MG: 10 TABLET ORAL at 05:53

## 2024-07-06 RX ADMIN — ACETAMINOPHEN 1000 MG: 500 TABLET ORAL at 02:01

## 2024-07-06 ASSESSMENT — EDINBURGH POSTNATAL DEPRESSION SCALE (EPDS)
I HAVE BEEN SO UNHAPPY THAT I HAVE HAD DIFFICULTY SLEEPING: NOT AT ALL
THINGS HAVE BEEN GETTING ON TOP OF ME: NO, I HAVE BEEN COPING AS WELL AS EVER
I HAVE BEEN ANXIOUS OR WORRIED FOR NO GOOD REASON: NO, NOT AT ALL
I HAVE BLAMED MYSELF UNNECESSARILY WHEN THINGS WENT WRONG: NO, NEVER
THE THOUGHT OF HARMING MYSELF HAS OCCURRED TO ME: NEVER
I HAVE BEEN SO UNHAPPY THAT I HAVE BEEN CRYING: NO, NEVER
I HAVE FELT SAD OR MISERABLE: NO, NOT AT ALL
I HAVE FELT SCARED OR PANICKY FOR NO GOOD REASON: NO, NOT AT ALL
I HAVE BEEN ABLE TO LAUGH AND SEE THE FUNNY SIDE OF THINGS: AS MUCH AS I ALWAYS COULD
I HAVE LOOKED FORWARD WITH ENJOYMENT TO THINGS: AS MUCH AS I EVER DID

## 2024-07-06 ASSESSMENT — PAIN DESCRIPTION - PAIN TYPE
TYPE: ACUTE PAIN

## 2024-07-06 NOTE — PROGRESS NOTES
0700: Received report from yoan Winter.  Patient in postpartum bed, patient comfortable and alert.  0745: Patient assessment completed, fundus firm and palpable, lochia light rubra. Pain management and interventions discussed with pt.  plan of care reviewed,  and verbalized understanding and will call if needs anything.

## 2024-07-06 NOTE — PROGRESS NOTES
1900- Received report from Laura PINEDO. Assumed care. 12 hour chart check, MAR and orders reviewed.      2015- Assessment complete. Fundus firm at umbilicus and palpable, lochia scant rubra. Pain management and interventions discussed with pt. SO at bedside. POC discussed. All questions and concerns discussed. No further concerns.

## 2024-07-06 NOTE — CARE PLAN
The patient is Stable - Low risk of patient condition declining or worsening    Shift Goals  Clinical Goals: Firm fundus, normal lochia  Patient Goals: Healthy mom, healthy baby  Family Goals: Support    Progress made toward(s) clinical / shift goals:      Problem: Knowledge Deficit - Postpartum  Goal: Patient will verbalize and demonstrate understanding of self and infant care  Outcome: Progressing     Problem: Psychosocial - Postpartum  Goal: Patient will verbalize and demonstrate effective bonding and parenting behavior  Outcome: Progressing

## 2024-07-06 NOTE — PROGRESS NOTES
Post Partum Progress Note    Name:   Toña Turner   Date/Time:  7/6/2024 - 5:38 AM  Chief Admitting Dx:  Labor and delivery indication for care or intervention [O75.9]  Delivery Type:  vaginal, spontaneous  Post-Op/Post Partum Days #:  1    Subjective:  Abdominal pain: yes  Ambulating:   yes  Tolerating liquids:  yes  Tolerating food:  yes common adult  Flatus:   yes  BM:    no  Bleeding:   without any bleeding  Voiding:   yes  Dizziness:   no  Feeding:   bottle - Enfamil    Vitals:    07/05/24 0133 07/05/24 0400 07/05/24 1037 07/05/24 1748   BP: 138/72 134/74 106/56 131/74   Pulse: 63 60 (!) 54 69   Resp:  18 14 14   Temp:  36.9 °C (98.5 °F) 36.4 °C (97.5 °F) 37.1 °C (98.7 °F)   TempSrc:  Temporal Temporal Temporal   SpO2:  99% 95% 99%   Weight:       Height:           Exam:  Breast: Tenderness no and Engorged no  Abdomen: Abdomen soft, non-tender. BS normal. No masses,  No organomegaly  Fundal Tenderness:  no  Fundus Firm: yes  Incision: none  Below umbilicus: N\A  Perineum: perineum intact  Lochia: mild  Extremities: Normal, trace extremities, peripheral pulses and reflexes normal, no edema, redness or tenderness in the calves or thighs, Homans sign is negative, no sign of DVT, feet normal, good pulses, normal color, temperature and sensation    Meds:  Current Facility-Administered Medications   Medication Dose    lactated ringers infusion      docusate sodium (Colace) capsule 100 mg  100 mg    ibuprofen (Motrin) tablet 800 mg  800 mg    acetaminophen (Tylenol) tablet 1,000 mg  1,000 mg    tetanus-dipth-acell pertussis (Tdap) inj 0.5 mL  0.5 mL    measles, mumps and rubella vaccine (Mmr) injection 0.5 mL  0.5 mL    prenatal plus vitamin (Stuartnatal 1+1) 27-1 MG tablet 1 Tablet  1 Tablet    LR infusion      oxytocin (Pitocin) infusion (for post delivery)  125 mL/hr    oxytocin (Pitocin) infusion (for induction)  0.5-20 cielo-units/min    ropivacaine 0.2 % (Naropin) injection         Labs:   Recent Labs      07/04/24 2055 07/05/24  0945   WBC 10.3 16.4*   RBC 4.18* 4.24   HEMOGLOBIN 10.7* 11.0*   HEMATOCRIT 34.0* 34.1*   MCV 81.3* 80.4*   MCH 25.6* 25.9*   MCHC 31.5* 32.3   RDW 42.8 42.2   PLATELETCT 190 200   MPV 10.8 11.1       Assessment:  Chief Admitting Dx:  Labor and delivery indication for care or intervention [O75.9]  Delivery Type:  vaginal, spontaneous  Tubal Ligation:  N\A    Plan:  Continue routine post partum care.  Pain management- Bhumi 10 mg x 1 dose now for lower abdominal pain, will schedule tylenol and ibuprofen around the clock  Desires to stay one more day due to family support  Anticipate d/c PPD#2    Annette Hayden C.N.M.

## 2024-07-06 NOTE — CARE PLAN
The patient is Stable - Low risk of patient condition declining or worsening    Shift Goals  Clinical Goals: fundus firm, lochia WDL  Patient Goals: Healthy mom, healthy baby  Family Goals: Support    Progress made toward(s) clinical / shift goals:    Problem: Altered Physiologic Condition  Goal: Patient physiologically stable as evidenced by normal lochia, palpable uterine involution and vitals within normal limits  Outcome: Progressing  Note: Fundus firm and midline. Lochia light, rubra. Vitals within defined limits     Problem: Infection - Postpartum  Goal: Postpartum patient will be free of signs and symptoms of infection  Outcome: Progressing  Note: Patient vitals have been WDL. Perineum free from s/s of infection       Patient is not progressing towards the following goals:

## 2024-07-06 NOTE — CARE PLAN
The patient is Stable - Low risk of patient condition declining or worsening    Shift Goals  Clinical Goals: stable vital signs  Patient Goals: Healthy mom, healthy baby  Family Goals: Support    Progress made toward(s) clinical / shift goals:    Patient will verbalize any concerns for either infant or self, asking appropriate question regarding infants care needs, and understanding self care needs as well.     Patient will continue to remain physiologically stable with normal lochia in color and amount, a palpable uterine involution and stable vital signs with no symptoms.     Patient is not progressing towards the following goals:

## 2024-07-06 NOTE — PROGRESS NOTES
0800  Received report from SHAHIDA Aguirre at change of shift. Patient assessed and POC discussed. Patient is resting in bed. Fundus firm, lochia light.  Patient denies any needs at this time. Call light within reach, bed in lowest position. Patient is encouraged to call for pain/med interventions and any other needs.

## 2024-07-07 VITALS
WEIGHT: 177 LBS | BODY MASS INDEX: 32.57 KG/M2 | HEIGHT: 62 IN | OXYGEN SATURATION: 97 % | RESPIRATION RATE: 17 BRPM | HEART RATE: 61 BPM | SYSTOLIC BLOOD PRESSURE: 129 MMHG | TEMPERATURE: 97.5 F | DIASTOLIC BLOOD PRESSURE: 74 MMHG

## 2024-07-07 PROCEDURE — A9270 NON-COVERED ITEM OR SERVICE: HCPCS | Performed by: NURSE PRACTITIONER

## 2024-07-07 PROCEDURE — 700102 HCHG RX REV CODE 250 W/ 637 OVERRIDE(OP): Performed by: NURSE PRACTITIONER

## 2024-07-07 RX ORDER — IBUPROFEN 800 MG/1
800 TABLET, FILM COATED ORAL EVERY 8 HOURS
Qty: 60 TABLET | Refills: 0 | Status: SHIPPED | OUTPATIENT
Start: 2024-07-07

## 2024-07-07 RX ORDER — PSEUDOEPHEDRINE HCL 30 MG
100 TABLET ORAL 2 TIMES DAILY PRN
Qty: 60 CAPSULE | Refills: 0 | Status: SHIPPED | OUTPATIENT
Start: 2024-07-07

## 2024-07-07 RX ORDER — ACETAMINOPHEN 500 MG
1000 TABLET ORAL EVERY 6 HOURS PRN
Qty: 60 TABLET | Refills: 0 | Status: SHIPPED | OUTPATIENT
Start: 2024-07-07

## 2024-07-07 RX ADMIN — ACETAMINOPHEN 1000 MG: 500 TABLET ORAL at 07:06

## 2024-07-07 RX ADMIN — IBUPROFEN 800 MG: 800 TABLET, FILM COATED ORAL at 09:32

## 2024-07-07 RX ADMIN — IBUPROFEN 800 MG: 800 TABLET, FILM COATED ORAL at 01:22

## 2024-07-07 ASSESSMENT — PAIN DESCRIPTION - PAIN TYPE
TYPE: ACUTE PAIN

## 2024-07-07 NOTE — DISCHARGE INSTRUCTIONS
Pelvic rest x6 weeks  Return for follow up visit in 5 weeks.  Call or come to ED for: heavy vaginal bleeding, fever >100.4, severe abdominal pain, severe headache, chest pain, shortness of breath,  N/V, or other concerns.    PATIENT DISCHARGE EDUCATION INSTRUCTION SHEET    REASONS TO CALL YOUR OBSTETRICIAN  Persistent fever, shaking, chills (Temperature higher than 100.4) may indicate you have an infection  Heavy bleeding: soaking more than 1 pad per hour; Passing clots an egg-sized clot or bigger may mean you have an postpartum hemorrhage  Foul odor from vagina or bad smelling or discolored discharge or blood  Breast infection (Mastitis symptoms); breast pain, chills, fever, redness or red streaks, may feel flu like symptoms  Urinary pain, burning or frequency  Incision that is not healing, increased redness, swelling, tenderness or pain, or any pus from episiotomy or  site may mean you have an infection  Redness, swelling, warmth, or painful to touch in the calf area of your leg may mean you have a blood clot  Severe or intensified depression, thoughts or feelings of wanting to hurt yourself or someone else   Pain in chest, obstructed breathing or shortness of breath (trouble catching your breath) may mean you are having a postpartum complication. Call your provider immediately   Headache that does not get better, even after taking medicine, a bad headache with vision changes or pain in the upper right area of your belly may mean you have high blood pressure or post birth preeclampsia. Call your provider immediately    HAND WASHING  All family and friends should wash their hands:  Before and after holding the baby  Before feeding the baby  After using the restroom or changing the baby's diaper    WOUND CARE  Ask your physician for additional care instructions. In general:  Episiotomy/Laceration  May use celso-spray bottle, witch hazel pads and dermaplast spray for comfort  Use celso-spray bottle after  urinating to cleanse perineal area  To prevent burning during urination spray celso-water bottle on labial area   Pat perineal area dry until episiotomy/laceration is healed  Continue to use celso-bottle until bleeding stops as needed  If have a 2nd degree laceration or greater, a Sitz bath can offer relief from soreness, burning, and inflammation   Sitz Bath   Sit in 6 inches of warm water and soak laceration as needed until the laceration heals    VAGINAL CARE AND BLEEDING  Nothing inside vagina for 6 weeks:   No sexual intercourse, tampons or douching  Bleeding may continue for 2-4 weeks. Amount and color may vary  Soaking 1 pad or more in an hour for several hours is considered heavy bleeding  Passing large egg sized blood clots can be concerning  If you feel like you have heavy bleeding or are having increasing amount of blood clots call your Obstetrician immediately  If you begin feeling faint upon standing, feeling sick to your stomach, have clammy skin, a really fast heartbeat, have chills, start feeling confused, dizzy, sleepy or weak, or feeling like you're going to faint call your Obstetrician immediately    HYPERTENSION   Preeclampsia or gestational hypertension are types of high blood pressure that only pregnant women can get. It is important for you to be aware of symptoms to seek early intervention and treatment. If you have any of these symptoms immediately call your Obstetrician    Vision changes or blurred vision   Severe headache or pain that is unrelieved with medication and will not go away  Persistent pain in upper abdomen or shoulder   Increased swelling of face, feet, or hands  Difficulty breathing or shortness of breath at rest  Urinating less than usual    URINATION AND BOWEL MOVEMENTS  Eating more fiber (bran cereal, fruits, and vegetables) and drinking plenty of fluids will help to avoid constipation  Urinary frequency and urgency after childbirth is normal  If you experience any urinary  "pain, burning or frequency call your provider    BIRTH CONTROL  It is possible to become pregnant at any time after delivery and while breastfeeding  Plan to discuss a method of birth control with your physician at your post delivery follow up visit    POSTPARTUM BLUES  During the first few days after birth, you may experience a sense of the \"blues\" which may include impatience, irritability or even crying. These feelings come and go quickly. However, as many as 1 in 10 women experience emotional symptoms known as postpartum depression.     POSTPARTUM DEPRESSION    May start as early as the second or third day after delivery or take several weeks or months to develop. Symptoms of \"blues\" are present, but are more intense: Crying spells; loss of appetite; feelings of hopelessness or loss of control; fear of touching the baby; over concern or no concern at all about the baby; little or no concern about your own appearance/caring for yourself; and/or inability to sleep or excessive sleeping. Contact your Obstetrician if you are experiencing any of these symptoms     PREVENTING SHAKEN BABY  If you are angry or stressed, PUT THE BABY IN THE CRIB, step away, take some deep breaths, and wait until you are calm to care for the baby. DO NOT SHAKE THE BABY. You are not alone, call a supporter for help.  [facility] 24/7 crisis call line (636-840-5256) or (1-652.209.4321)  You can also text them, text \"ANSWER\" (091785)  "

## 2024-07-07 NOTE — DISCHARGE SUMMARY
"  Discharge Summary:     Date of Admission: 2024  Date of Discharge: 24      Admitting diagnosis:    1. Pregnancy @ 37w5d  2. Polyhydramnios  GBS+  Penicillin Allergy  3. Grandmultiparous  4. AMA  5. Suspected macrosomia (S>D)  Incomplete GDM testing  6. H/o PPH  7. Marijuana use  8. H/o seizures    Discharge Diagnosis:   1. Status post  24.  2. Grand multiparous  4. AMA  5. Suspected macrosomia  6. H/o PPH  7. Marijuana use  8. H/o seizures    History reviewed. No pertinent past medical history.  OB History    Para Term  AB Living   10 7 7   3 7   SAB IAB Ectopic Molar Multiple Live Births   3       0 7      # Outcome Date GA Lbr John/2nd Weight Sex Delivery Anes PTL Lv   10 Term 24 37w5d / 00:07 3.69 kg (8 lb 2.2 oz) M Vag-Spont None N MACHO   9 Term 10/28/19  / 00:01 2.645 kg (5 lb 13.3 oz) F Vag-Spont None N MACHO   8 Term 13 38w0d  3.289 kg (7 lb 4 oz) M Vag-Spont   MACHO   7 Term 12 38w0d  2.608 kg (5 lb 12 oz) M Vag-Spont   MACHO      Birth Comments: \"Baby delivered face up with his cord wrapped around his neck and tied in knots\",   6 Term 09 38w0d  2.835 kg (6 lb 4 oz) F Vag-Spont   MACHO   5 Term 08 38w0d  2.835 kg (6 lb 4 oz) F Vag-Spont   MACHO   4 Term 10/19/06 38w0d  2.58 kg (5 lb 11 oz) M Vag-Spont   MACHO   3 SAB            2 SAB            1 SAB              Past Surgical History:   Procedure Laterality Date    OTHER ABDOMINAL SURGERY      appendectomy    OTHER  1998    tonselectomy     Amoxicillin, Codeine, Latex, Pcn [penicillins], and Vicodin [hydrocodone-acetaminophen]    Patient Active Problem List   Diagnosis    Advanced maternal age in multigravida    Late prenatal care @ 24 weeks    Grand multipara    Marijuana use    History of seizures    History of postpartum hemorrhage    Request for sterilization    S>D       Hospital Course:   Pt is a 34 y.o. now  who presented for painful contractions and was found to be in active labor, was " admitted. Notably was GBS +, with penicillin allergy was started on clindamycin. Prenatal care notable for no GDM testing, suspected macrosomia, S>D.    Labor induction performed with AROM.  Epidural was placed with good pain relief. She pushed well to deliver viable male infant in NASEEM position at 0117 with coached pushing efforts. Nuchal x1 reduced via Somersault maneuver and infant placed to maternal abdomen where he was dried and stimulated. A spontaneous cry was noted. Apgar 8, 9. However, RT requested more immediate evaluation of infant related to color and thus cord was clamped and cut. Infant then taken to warmer.   ml.      Patient had significant postpartum cramping pain that required opioid use for adeuate control. By discharge pain was well controlled wit Tylenol and Motrin.      Postpartum course notable for early ambulation, tolerance of diet, spontaneous voiding, and appropriate feeding of infant. She has remained afebrile and blood pressure has been well controlled. All maternal questions and concerns addressed.    Patient plans to prevent pregnancy via sterilization of her and .         Physical Exam:  Temp:  [36.6 °C (97.8 °F)] 36.6 °C (97.8 °F)  Pulse:  [60] 60  Resp:  [18] 18  BP: (138)/(83) 138/83  SpO2:  [97 %] 97 %  Physical Exam  General: well  Chest/Breasts: nipples intact   Abdomen: nontender, soft  Fundus: firm and at umbilicus  Incision: not applicable, (vaginal delivery)  Perineum: deferred  Extremities: symmetric and no edema, calves nontender    Current Facility-Administered Medications   Medication Dose    acetaminophen (Tylenol) tablet 1,000 mg  1,000 mg    ibuprofen (Motrin) tablet 800 mg  800 mg    lactated ringers infusion      docusate sodium (Colace) capsule 100 mg  100 mg    tetanus-dipth-acell pertussis (Tdap) inj 0.5 mL  0.5 mL    measles, mumps and rubella vaccine (Mmr) injection 0.5 mL  0.5 mL    prenatal plus vitamin (Stuartnatal 1+1) 27-1 MG tablet 1 Tablet   1 Tablet    LR infusion      oxytocin (Pitocin) infusion (for post delivery)  125 mL/hr    oxytocin (Pitocin) infusion (for induction)  0.5-20 cielo-units/min    ropivacaine 0.2 % (Naropin) injection         Recent Labs     07/04/24 2055 07/05/24  0945   WBC 10.3 16.4*   RBC 4.18* 4.24   HEMOGLOBIN 10.7* 11.0*   HEMATOCRIT 34.0* 34.1*   MCV 81.3* 80.4*   MCH 25.6* 25.9*   MCHC 31.5* 32.3   RDW 42.8 42.2   PLATELETCT 190 200   MPV 10.8 11.1         Activity/ Discharge Instructions::   Discharge to home  Pelvic Rest x 6 weeks  No heavy lifting x4 weeks  Call or come to ED for: heavy vaginal bleeding, fever >100.4, severe abdominal pain, severe headache, chest pain, shortness of breath,  N/V, incisional drainage, or other concerns.       Follow up:  [facility] in 5 weeks for vaginal delivery;  Discharge Meds:   Current Outpatient Medications   Medication Sig Dispense Refill    acetaminophen (TYLENOL) 500 MG Tab Take 2 Tablets by mouth every 6 hours as needed for Mild Pain (Maximum 6 pills in 24H). 60 Tablet 0    docusate sodium 100 MG Cap Take 100 mg by mouth 2 times a day as needed for Constipation. 60 Capsule 0    ibuprofen (MOTRIN) 800 MG Tab Take 1 Tablet by mouth every 8 hours. 60 Tablet 0       Raffy Gaines MD  [facility]  PGY-1

## 2024-07-07 NOTE — CARE PLAN
Problem: Knowledge Deficit - Postpartum  Goal: Patient will verbalize and demonstrate understanding of self and infant care  Outcome: Progressing     Problem: Psychosocial - Postpartum  Goal: Patient will verbalize and demonstrate effective bonding and parenting behavior  Outcome: Progressing     Problem: Altered Physiologic Condition  Goal: Patient physiologically stable as evidenced by normal lochia, palpable uterine involution and vitals within normal limits  Outcome: Progressing   The patient is Stable - Low risk of patient condition declining or worsening    Shift Goals  Clinical Goals: stable vS andpain control  Patient Goals: pain control  Family Goals: Support    Progress made toward(s) clinical / shift goals:    Pt reports comfort after pain interventions, Fundus firm and lochia light, VSS, educated on POC, needs met at this time. Questions answered. Will continue to educate.

## 2024-07-07 NOTE — CARE PLAN
The patient is Stable - Low risk of patient condition declining or worsening    Shift Goals  Clinical Goals: VSS, lochia WDL, pain WDL  Patient Goals:   Family Goals:     Progress made toward(s) clinical / shift goals:  VSS, lochia scant, pain controlled with PRN medications and rest. Safe to discharge home with family and infant.      Problem: Knowledge Deficit - Postpartum  Goal: Patient will verbalize and demonstrate understanding of self and infant care  Outcome: Met     Problem: Altered Physiologic Condition  Goal: Patient physiologically stable as evidenced by normal lochia, palpable uterine involution and vitals within normal limits  Outcome: Met

## 2024-07-08 ENCOUNTER — TELEPHONE (OUTPATIENT)
Dept: OBGYN | Facility: CLINIC | Age: 34
End: 2024-07-08
Payer: MEDICAID

## 2024-08-09 ENCOUNTER — POST PARTUM (OUTPATIENT)
Dept: OBGYN | Facility: CLINIC | Age: 34
End: 2024-08-09
Payer: MEDICAID

## 2024-08-09 VITALS — WEIGHT: 158 LBS | DIASTOLIC BLOOD PRESSURE: 70 MMHG | SYSTOLIC BLOOD PRESSURE: 110 MMHG | BODY MASS INDEX: 28.9 KG/M2

## 2024-08-09 PROBLEM — O09.529 ADVANCED MATERNAL AGE IN MULTIGRAVIDA: Status: RESOLVED | Noted: 2024-03-25 | Resolved: 2024-08-09

## 2024-08-09 PROBLEM — O09.30 LATE PRENATAL CARE: Status: RESOLVED | Noted: 2024-03-25 | Resolved: 2024-08-09

## 2024-08-09 PROBLEM — O26.849 FETAL SIZE INCONSISTENT WITH DATES: Status: RESOLVED | Noted: 2024-06-21 | Resolved: 2024-08-09

## 2024-08-09 PROCEDURE — 3074F SYST BP LT 130 MM HG: CPT | Performed by: MIDWIFE

## 2024-08-09 PROCEDURE — 0503F POSTPARTUM CARE VISIT: CPT | Performed by: MIDWIFE

## 2024-08-09 PROCEDURE — 3078F DIAST BP <80 MM HG: CPT | Performed by: MIDWIFE

## 2024-08-09 RX ORDER — NORETHINDRONE ACETATE AND ETHINYL ESTRADIOL .02; 1 MG/1; MG/1
1 TABLET ORAL DAILY
Qty: 84 TABLET | Refills: 3 | Status: SHIPPED | OUTPATIENT
Start: 2024-08-09

## 2024-08-09 ASSESSMENT — EDINBURGH POSTNATAL DEPRESSION SCALE (EPDS)
I HAVE LOOKED FORWARD WITH ENJOYMENT TO THINGS: AS MUCH AS I EVER DID
I HAVE BEEN SO UNHAPPY THAT I HAVE BEEN CRYING: NO, NEVER
I HAVE BEEN ANXIOUS OR WORRIED FOR NO GOOD REASON: NO, NOT AT ALL
I HAVE FELT SAD OR MISERABLE: NO, NOT AT ALL
I HAVE BEEN SO UNHAPPY THAT I HAVE HAD DIFFICULTY SLEEPING: NOT AT ALL
TOTAL SCORE: 0
I HAVE BEEN ABLE TO LAUGH AND SEE THE FUNNY SIDE OF THINGS: AS MUCH AS I ALWAYS COULD
THE THOUGHT OF HARMING MYSELF HAS OCCURRED TO ME: NEVER
I HAVE FELT SCARED OR PANICKY FOR NO GOOD REASON: NO, NOT AT ALL
THINGS HAVE BEEN GETTING ON TOP OF ME: NO, I HAVE BEEN COPING AS WELL AS EVER
I HAVE BLAMED MYSELF UNNECESSARILY WHEN THINGS WENT WRONG: NO, NEVER

## 2024-08-09 ASSESSMENT — FIBROSIS 4 INDEX: FIB4 SCORE: 0.68

## 2024-08-09 NOTE — PROGRESS NOTES
Subjective   Subjective:    Toña Turner is a 34 y.o. female who presents for her postpartum exam 5 weeks following . Her prenatal course was complicated by AMA and late to care.  Her delivery was uncomplicated. Her method of feeding infant is bottle. She desires an BTL for her birth control method and desires bridge method of COCs. Denies contraindications to estrogen use. Reports no sex prior to this appointment. Patient denies crying spells, irritability, or mood swings.     Pap WNL on 3/2024    Eating a regular diet without difficulty.   Bowel movement are Normal.      Denies breast problems, dysuria, vaginal bleeding, vaginal itching    EDPS 0    Problem List     Patient Active Problem List    Diagnosis Date Noted    Request for sterilization 2024    Grand multipara 2024    Marijuana use 2024    History of seizures 2024    History of postpartum hemorrhage 2024       Objective    Lab:  Recent Results (from the past 1008 hour(s))   HEMOGLOBIN A1C    Collection Time: 24 11:39 AM   Result Value Ref Range    Glycohemoglobin 5.5 4.0 - 5.6 %    Est Avg Glucose 111 mg/dL   Comp Metabolic Panel    Collection Time: 24 10:10 AM   Result Value Ref Range    Sodium 135 135 - 145 mmol/L    Potassium 3.6 3.6 - 5.5 mmol/L    Chloride 103 96 - 112 mmol/L    Co2 18 (L) 20 - 33 mmol/L    Anion Gap 14.0 7.0 - 16.0    Glucose 122 (H) 65 - 99 mg/dL    Bun 7 (L) 8 - 22 mg/dL    Creatinine 0.51 0.50 - 1.40 mg/dL    Calcium 9.5 8.5 - 10.5 mg/dL    Correct Calcium 10.1 8.5 - 10.5 mg/dL    AST(SGOT) 12 12 - 45 U/L    ALT(SGPT) 10 2 - 50 U/L    Alkaline Phosphatase 118 (H) 30 - 99 U/L    Total Bilirubin 0.3 0.1 - 1.5 mg/dL    Albumin 3.3 3.2 - 4.9 g/dL    Total Protein 6.2 6.0 - 8.2 g/dL    Globulin 2.9 1.9 - 3.5 g/dL    A-G Ratio 1.1 g/dL   ESTIMATED GFR    Collection Time: 24 10:10 AM   Result Value Ref Range    GFR (CKD-EPI) 124 >60 mL/min/1.73 m 2   Hold Blood Bank Specimen (Not  Tested)    Collection Time: 07/04/24  8:55 PM   Result Value Ref Range    Holding Tube - Bb DONE    CBC with differential    Collection Time: 07/04/24  8:55 PM   Result Value Ref Range    WBC 10.3 4.8 - 10.8 K/uL    RBC 4.18 (L) 4.20 - 5.40 M/uL    Hemoglobin 10.7 (L) 12.0 - 16.0 g/dL    Hematocrit 34.0 (L) 37.0 - 47.0 %    MCV 81.3 (L) 81.4 - 97.8 fL    MCH 25.6 (L) 27.0 - 33.0 pg    MCHC 31.5 (L) 32.2 - 35.5 g/dL    RDW 42.8 35.9 - 50.0 fL    Platelet Count 190 164 - 446 K/uL    MPV 10.8 9.0 - 12.9 fL    Neutrophils-Polys 71.70 44.00 - 72.00 %    Lymphocytes 18.20 (L) 22.00 - 41.00 %    Monocytes 7.90 0.00 - 13.40 %    Eosinophils 1.40 0.00 - 6.90 %    Basophils 0.20 0.00 - 1.80 %    Immature Granulocytes 0.60 0.00 - 0.90 %    Nucleated RBC 0.00 0.00 - 0.20 /100 WBC    Neutrophils (Absolute) 7.38 1.82 - 7.42 K/uL    Lymphs (Absolute) 1.87 1.00 - 4.80 K/uL    Monos (Absolute) 0.81 0.00 - 0.85 K/uL    Eos (Absolute) 0.14 0.00 - 0.51 K/uL    Baso (Absolute) 0.02 0.00 - 0.12 K/uL    Immature Granulocytes (abs) 0.06 0.00 - 0.11 K/uL    NRBC (Absolute) 0.00 K/uL   T.PALLIDUM AB JUSTO (Syphilis)    Collection Time: 07/04/24  8:55 PM   Result Value Ref Range    Syphilis, Treponemal Qual Non-Reactive Non-Reactive   COD - Adult (Type and Screen)    Collection Time: 07/04/24  8:55 PM   Result Value Ref Range    ABO Grouping Only O     Rh Grouping Only POS     Antibody Screen-Cod NEG    CBC without differential- Once in AM regardless of delivery time    Collection Time: 07/05/24  9:45 AM   Result Value Ref Range    WBC 16.4 (H) 4.8 - 10.8 K/uL    RBC 4.24 4.20 - 5.40 M/uL    Hemoglobin 11.0 (L) 12.0 - 16.0 g/dL    Hematocrit 34.1 (L) 37.0 - 47.0 %    MCV 80.4 (L) 81.4 - 97.8 fL    MCH 25.9 (L) 27.0 - 33.0 pg    MCHC 32.3 32.2 - 35.5 g/dL    RDW 42.2 35.9 - 50.0 fL    Platelet Count 200 164 - 446 K/uL    MPV 11.1 9.0 - 12.9 fL     Vitals:    08/09/24 1346   BP: 110/70   Weight: 158 lb     Vitals:    08/09/24 1346   BP: 110/70      Objective    Well nourished female in no acute distress  A& O x 3  Respirations clear and non labored on room air  No edema noted and pulses WNL bilaterally in lower extremities; no claudication  BS x 4; no guarding or tenderness  Breasts: no erythema or discharge. No masses or tenderness.     Genitourinary: Pelvic exam declined    Chaperone was offered and declined    Assessment   Assessment:    1. Postpartum Exam  2. General Counseling and Advice on Contraception  3. Screening for Postpartum Depression    Plan   Plan:    Contraceptive counseling- Discuss VIDAL use and safety. Return for tubal consult with MD.   2. Discussed diet, exercise and resumption of sexual activity.  Discussed signs and symptoms of stress incontinence and reviewed pelvic floor exercises.    3.  Follow up yearly and prn

## 2024-08-09 NOTE — PROGRESS NOTES
Pt here today for postpartum exam.  Delivery Date and Type  7/5 vaginal  Currently: bottle feeding  BCM: discuss BTL  Mood. good  LMP: 08/09  Good ph: [phone] (home)

## 2024-09-19 ENCOUNTER — OFFICE VISIT (OUTPATIENT)
Dept: OBGYN | Facility: CLINIC | Age: 34
End: 2024-09-19
Payer: MEDICAID

## 2024-09-19 VITALS
WEIGHT: 163 LBS | SYSTOLIC BLOOD PRESSURE: 104 MMHG | DIASTOLIC BLOOD PRESSURE: 76 MMHG | BODY MASS INDEX: 30 KG/M2 | HEIGHT: 62 IN

## 2024-09-19 DIAGNOSIS — Z30.09 CONSULTATION FOR FEMALE STERILIZATION: ICD-10-CM

## 2024-09-19 ASSESSMENT — FIBROSIS 4 INDEX: FIB4 SCORE: 0.68

## 2024-09-19 NOTE — Clinical Note
Plan: Laparoscopic bilateral salpingectomy Assist: yes Duration: 1 hour Outpatient Needs preop with Ray prior.  No need for PCP clearance.

## 2024-09-19 NOTE — PROGRESS NOTES
Patient here for BTL consult  BTL consent signed today  LMP=8/12/24  BCM:BC pills  Last pap: 3/25/24=negative, (negative) HPV  Phone number: [phone]  Pharmacy verified

## 2024-09-19 NOTE — PROGRESS NOTES
"GYN FOLLOW UP VISIT    CC:  Advice Only       HPI: Patient is a 34 y.o.  who presents desiring surgical sterilization. She is on cOCPs for birth control. She is completely done with childbearing.   Has had an appendectomy and tonsillectomy in the past.   Currently having some breakthrough bleeding on cOCPs.   Has tried Mirena IUD twice and reports they became \"dislodged\".        ROS:   General: denies fever / chills  HEENT: denies sore throat:  CV: denies chest pain:  Repiratory: denies shortness of breath  GI: denies abdominal pain  : denies dysuria:    PFSH:  I personally reviewed the past medical and surgical histories.       PHYSICAL EXAMINATION:  Vital Signs:   Vitals:    24 0832   BP: 104/76   BP Location: Right arm   Patient Position: Sitting   BP Cuff Size: Adult   Weight: 163 lb   Height: 5' 2\"     Body mass index is 29.81 kg/m².    Gen: appears well, NAD  Exam deferred    ASSESSMENT AND PLAN:  34 y.o.  desiring surgical sterilization.   Discussed laparoscopic bilateral salpingectomy. Educated patient regarding method being permanent and irreversible for sterilization, which she highly desires. Bilateral salpingectomy done to also prevent future tubal disease/malignancies. Ovaries would still be in place. Procedure, and recovery briefly discussed. Federal sterilization consent signed today and scanned into chart.     Continue low dose cOCPs until surgery.  Request for surgery submitted.     RTC for preop.     1. Consultation for female sterilization              Time spent: 20 minutes    Max Pineda M.D.  "

## 2024-10-23 ENCOUNTER — APPOINTMENT (OUTPATIENT)
Dept: ADMISSIONS | Facility: MEDICAL CENTER | Age: 34
End: 2024-10-23
Attending: OBSTETRICS & GYNECOLOGY
Payer: MEDICAID

## 2024-10-29 ENCOUNTER — GYNECOLOGY VISIT (OUTPATIENT)
Dept: OBGYN | Facility: CLINIC | Age: 34
End: 2024-10-29
Payer: MEDICAID

## 2024-10-29 VITALS
DIASTOLIC BLOOD PRESSURE: 74 MMHG | WEIGHT: 167 LBS | SYSTOLIC BLOOD PRESSURE: 118 MMHG | HEIGHT: 62 IN | BODY MASS INDEX: 30.73 KG/M2

## 2024-10-29 DIAGNOSIS — Z01.818 PREOPERATIVE EXAM FOR GYNECOLOGIC SURGERY: ICD-10-CM

## 2024-10-29 DIAGNOSIS — Z30.2 ADMISSION FOR STERILIZATION: ICD-10-CM

## 2024-10-29 ASSESSMENT — FIBROSIS 4 INDEX: FIB4 SCORE: 0.68

## 2024-10-30 ENCOUNTER — PRE-ADMISSION TESTING (OUTPATIENT)
Dept: ADMISSIONS | Facility: MEDICAL CENTER | Age: 34
End: 2024-10-30
Attending: OBSTETRICS & GYNECOLOGY
Payer: MEDICAID

## 2024-10-30 NOTE — OR NURSING
Pre admit appointment completed with Licha Turner for surgery/procedure on 11/18/24.    Medication and fasting instructions given per RN pre procedure protocol. Encouraged patient to increase oral intake the day prior to procedure including intake of electrolyte drinks such as Gatorade or electrolyte water. Patient instructed to stop taking all vitamins and herbal supplements 7 days prior to surgery. Patient instructed to stop any NSAIDS 5 days prior to surgery, unless otherwise instructed by medical provider.     Patient verbalizes understanding of all instructions given. No further questions at this time. Medication instruction sheet stapled to testing passport for patient to receive at testing appointment on 11/7/24.

## 2024-11-07 ENCOUNTER — PRE-ADMISSION TESTING (OUTPATIENT)
Dept: ADMISSIONS | Facility: MEDICAL CENTER | Age: 34
End: 2024-11-07
Attending: OBSTETRICS & GYNECOLOGY
Payer: MEDICAID

## 2024-11-07 DIAGNOSIS — Z01.812 PRE-OPERATIVE LABORATORY EXAMINATION: ICD-10-CM

## 2024-11-07 LAB
ABO GROUP BLD: NORMAL
APPEARANCE UR: CLEAR
BASOPHILS # BLD AUTO: 0.4 % (ref 0–1.8)
BASOPHILS # BLD: 0.03 K/UL (ref 0–0.12)
BILIRUB UR QL STRIP.AUTO: NEGATIVE
BLD GP AB SCN SERPL QL: NORMAL
COLOR UR: YELLOW
EOSINOPHIL # BLD AUTO: 0.33 K/UL (ref 0–0.51)
EOSINOPHIL NFR BLD: 4.4 % (ref 0–6.9)
ERYTHROCYTE [DISTWIDTH] IN BLOOD BY AUTOMATED COUNT: 40.4 FL (ref 35.9–50)
GLUCOSE UR STRIP.AUTO-MCNC: NEGATIVE MG/DL
HCG SERPL QL: NEGATIVE
HCT VFR BLD AUTO: 41.6 % (ref 37–47)
HGB BLD-MCNC: 14 G/DL (ref 12–16)
IMM GRANULOCYTES # BLD AUTO: 0.03 K/UL (ref 0–0.11)
IMM GRANULOCYTES NFR BLD AUTO: 0.4 % (ref 0–0.9)
KETONES UR STRIP.AUTO-MCNC: NEGATIVE MG/DL
LEUKOCYTE ESTERASE UR QL STRIP.AUTO: NEGATIVE
LYMPHOCYTES # BLD AUTO: 2.11 K/UL (ref 1–4.8)
LYMPHOCYTES NFR BLD: 28.3 % (ref 22–41)
MCH RBC QN AUTO: 29.5 PG (ref 27–33)
MCHC RBC AUTO-ENTMCNC: 33.7 G/DL (ref 32.2–35.5)
MCV RBC AUTO: 87.8 FL (ref 81.4–97.8)
MICRO URNS: NORMAL
MONOCYTES # BLD AUTO: 0.48 K/UL (ref 0–0.85)
MONOCYTES NFR BLD AUTO: 6.4 % (ref 0–13.4)
NEUTROPHILS # BLD AUTO: 4.48 K/UL (ref 1.82–7.42)
NEUTROPHILS NFR BLD: 60.1 % (ref 44–72)
NITRITE UR QL STRIP.AUTO: NEGATIVE
NRBC # BLD AUTO: 0 K/UL
NRBC BLD-RTO: 0 /100 WBC (ref 0–0.2)
PH UR STRIP.AUTO: 6 [PH] (ref 5–8)
PLATELET # BLD AUTO: 235 K/UL (ref 164–446)
PMV BLD AUTO: 11 FL (ref 9–12.9)
PROT UR QL STRIP: NEGATIVE MG/DL
RBC # BLD AUTO: 4.74 M/UL (ref 4.2–5.4)
RBC UR QL AUTO: NEGATIVE
RH BLD: NORMAL
SP GR UR STRIP.AUTO: 1.01
UROBILINOGEN UR STRIP.AUTO-MCNC: 0.2 EU/DL
WBC # BLD AUTO: 7.5 K/UL (ref 4.8–10.8)

## 2024-11-07 PROCEDURE — 85025 COMPLETE CBC W/AUTO DIFF WBC: CPT

## 2024-11-07 PROCEDURE — 86900 BLOOD TYPING SEROLOGIC ABO: CPT

## 2024-11-07 PROCEDURE — 36415 COLL VENOUS BLD VENIPUNCTURE: CPT

## 2024-11-07 PROCEDURE — 84703 CHORIONIC GONADOTROPIN ASSAY: CPT

## 2024-11-07 PROCEDURE — 86901 BLOOD TYPING SEROLOGIC RH(D): CPT

## 2024-11-07 PROCEDURE — 81003 URINALYSIS AUTO W/O SCOPE: CPT

## 2024-11-07 PROCEDURE — 86850 RBC ANTIBODY SCREEN: CPT

## 2024-11-18 ENCOUNTER — HOSPITAL ENCOUNTER (OUTPATIENT)
Facility: MEDICAL CENTER | Age: 34
End: 2024-11-18
Attending: OBSTETRICS & GYNECOLOGY | Admitting: OBSTETRICS & GYNECOLOGY
Payer: MEDICAID

## 2024-11-18 ENCOUNTER — ANESTHESIA (OUTPATIENT)
Dept: SURGERY | Facility: MEDICAL CENTER | Age: 34
End: 2024-11-18
Payer: MEDICAID

## 2024-11-18 ENCOUNTER — ANESTHESIA EVENT (OUTPATIENT)
Dept: SURGERY | Facility: MEDICAL CENTER | Age: 34
End: 2024-11-18
Payer: MEDICAID

## 2024-11-18 VITALS
TEMPERATURE: 97.4 F | HEIGHT: 62 IN | WEIGHT: 171.08 LBS | HEART RATE: 54 BPM | OXYGEN SATURATION: 99 % | BODY MASS INDEX: 31.48 KG/M2 | RESPIRATION RATE: 18 BRPM | DIASTOLIC BLOOD PRESSURE: 53 MMHG | SYSTOLIC BLOOD PRESSURE: 108 MMHG

## 2024-11-18 DIAGNOSIS — G89.18 ACUTE POSTOPERATIVE PAIN: ICD-10-CM

## 2024-11-18 LAB
HCG UR QL: NEGATIVE
PATHOLOGY CONSULT NOTE: NORMAL

## 2024-11-18 PROCEDURE — 160039 HCHG SURGERY MINUTES - EA ADDL 1 MIN LEVEL 3: Performed by: OBSTETRICS & GYNECOLOGY

## 2024-11-18 PROCEDURE — 160048 HCHG OR STATISTICAL LEVEL 1-5: Performed by: OBSTETRICS & GYNECOLOGY

## 2024-11-18 PROCEDURE — 160035 HCHG PACU - 1ST 60 MINS PHASE I: Performed by: OBSTETRICS & GYNECOLOGY

## 2024-11-18 PROCEDURE — 160009 HCHG ANES TIME/MIN: Performed by: OBSTETRICS & GYNECOLOGY

## 2024-11-18 PROCEDURE — 160002 HCHG RECOVERY MINUTES (STAT): Performed by: OBSTETRICS & GYNECOLOGY

## 2024-11-18 PROCEDURE — 700101 HCHG RX REV CODE 250: Mod: UD | Performed by: ANESTHESIOLOGY

## 2024-11-18 PROCEDURE — 700105 HCHG RX REV CODE 258: Mod: UD | Performed by: ANESTHESIOLOGY

## 2024-11-18 PROCEDURE — 160046 HCHG PACU - 1ST 60 MINS PHASE II: Performed by: OBSTETRICS & GYNECOLOGY

## 2024-11-18 PROCEDURE — 700102 HCHG RX REV CODE 250 W/ 637 OVERRIDE(OP): Mod: UD | Performed by: ANESTHESIOLOGY

## 2024-11-18 PROCEDURE — 700111 HCHG RX REV CODE 636 W/ 250 OVERRIDE (IP): Mod: JZ,UD | Performed by: ANESTHESIOLOGY

## 2024-11-18 PROCEDURE — 81025 URINE PREGNANCY TEST: CPT

## 2024-11-18 PROCEDURE — A9270 NON-COVERED ITEM OR SERVICE: HCPCS | Mod: UD | Performed by: ANESTHESIOLOGY

## 2024-11-18 PROCEDURE — 700101 HCHG RX REV CODE 250: Mod: UD | Performed by: OBSTETRICS & GYNECOLOGY

## 2024-11-18 PROCEDURE — 88302 TISSUE EXAM BY PATHOLOGIST: CPT

## 2024-11-18 PROCEDURE — 700111 HCHG RX REV CODE 636 W/ 250 OVERRIDE (IP): Mod: UD | Performed by: ANESTHESIOLOGY

## 2024-11-18 PROCEDURE — 58661 LAPAROSCOPY REMOVE ADNEXA: CPT | Performed by: OBSTETRICS & GYNECOLOGY

## 2024-11-18 PROCEDURE — 160025 RECOVERY II MINUTES (STATS): Performed by: OBSTETRICS & GYNECOLOGY

## 2024-11-18 PROCEDURE — 160028 HCHG SURGERY MINUTES - 1ST 30 MINS LEVEL 3: Performed by: OBSTETRICS & GYNECOLOGY

## 2024-11-18 RX ORDER — LABETALOL HYDROCHLORIDE 5 MG/ML
5 INJECTION, SOLUTION INTRAVENOUS
Status: DISCONTINUED | OUTPATIENT
Start: 2024-11-18 | End: 2024-11-18 | Stop reason: HOSPADM

## 2024-11-18 RX ORDER — MIDAZOLAM HYDROCHLORIDE 1 MG/ML
INJECTION INTRAMUSCULAR; INTRAVENOUS PRN
Status: DISCONTINUED | OUTPATIENT
Start: 2024-11-18 | End: 2024-11-18 | Stop reason: SURG

## 2024-11-18 RX ORDER — SCOLOPAMINE TRANSDERMAL SYSTEM 1 MG/1
1 PATCH, EXTENDED RELEASE TRANSDERMAL
Status: DISCONTINUED | OUTPATIENT
Start: 2024-11-18 | End: 2024-11-18 | Stop reason: HOSPADM

## 2024-11-18 RX ORDER — SENNA AND DOCUSATE SODIUM 50; 8.6 MG/1; MG/1
1 TABLET, FILM COATED ORAL DAILY
Qty: 30 TABLET | Refills: 11 | Status: SHIPPED | OUTPATIENT
Start: 2024-11-18

## 2024-11-18 RX ORDER — MEPERIDINE HYDROCHLORIDE 25 MG/ML
6.25 INJECTION INTRAMUSCULAR; INTRAVENOUS; SUBCUTANEOUS
Status: DISCONTINUED | OUTPATIENT
Start: 2024-11-18 | End: 2024-11-18 | Stop reason: HOSPADM

## 2024-11-18 RX ORDER — HYDROMORPHONE HYDROCHLORIDE 1 MG/ML
0.2 INJECTION, SOLUTION INTRAMUSCULAR; INTRAVENOUS; SUBCUTANEOUS
Status: DISCONTINUED | OUTPATIENT
Start: 2024-11-18 | End: 2024-11-18 | Stop reason: HOSPADM

## 2024-11-18 RX ORDER — OXYCODONE HCL 5 MG/5 ML
10 SOLUTION, ORAL ORAL
Status: COMPLETED | OUTPATIENT
Start: 2024-11-18 | End: 2024-11-18

## 2024-11-18 RX ORDER — HYDROMORPHONE HYDROCHLORIDE 1 MG/ML
0.4 INJECTION, SOLUTION INTRAMUSCULAR; INTRAVENOUS; SUBCUTANEOUS
Status: DISCONTINUED | OUTPATIENT
Start: 2024-11-18 | End: 2024-11-18 | Stop reason: HOSPADM

## 2024-11-18 RX ORDER — LIDOCAINE HYDROCHLORIDE 20 MG/ML
INJECTION, SOLUTION EPIDURAL; INFILTRATION; INTRACAUDAL; PERINEURAL PRN
Status: DISCONTINUED | OUTPATIENT
Start: 2024-11-18 | End: 2024-11-18 | Stop reason: SURG

## 2024-11-18 RX ORDER — ACETAMINOPHEN 500 MG
1000 TABLET ORAL ONCE
Status: COMPLETED | OUTPATIENT
Start: 2024-11-18 | End: 2024-11-18

## 2024-11-18 RX ORDER — HALOPERIDOL 5 MG/ML
1 INJECTION INTRAMUSCULAR
Status: DISCONTINUED | OUTPATIENT
Start: 2024-11-18 | End: 2024-11-18 | Stop reason: HOSPADM

## 2024-11-18 RX ORDER — IBUPROFEN 800 MG/1
800 TABLET, FILM COATED ORAL EVERY 8 HOURS PRN
Qty: 30 TABLET | Refills: 2 | Status: SHIPPED | OUTPATIENT
Start: 2024-11-18

## 2024-11-18 RX ORDER — OXYCODONE HYDROCHLORIDE 5 MG/1
5 TABLET ORAL EVERY 4 HOURS PRN
Qty: 30 TABLET | Refills: 0 | Status: SHIPPED | OUTPATIENT
Start: 2024-11-18 | End: 2024-11-21 | Stop reason: RX

## 2024-11-18 RX ORDER — OXYCODONE HCL 5 MG/5 ML
5 SOLUTION, ORAL ORAL
Status: COMPLETED | OUTPATIENT
Start: 2024-11-18 | End: 2024-11-18

## 2024-11-18 RX ORDER — DIPHENHYDRAMINE HYDROCHLORIDE 50 MG/ML
12.5 INJECTION INTRAMUSCULAR; INTRAVENOUS
Status: DISCONTINUED | OUTPATIENT
Start: 2024-11-18 | End: 2024-11-18 | Stop reason: HOSPADM

## 2024-11-18 RX ORDER — DEXAMETHASONE SODIUM PHOSPHATE 4 MG/ML
INJECTION, SOLUTION INTRA-ARTICULAR; INTRALESIONAL; INTRAMUSCULAR; INTRAVENOUS; SOFT TISSUE PRN
Status: DISCONTINUED | OUTPATIENT
Start: 2024-11-18 | End: 2024-11-18 | Stop reason: SURG

## 2024-11-18 RX ORDER — EPHEDRINE SULFATE 50 MG/ML
5 INJECTION, SOLUTION INTRAVENOUS
Status: DISCONTINUED | OUTPATIENT
Start: 2024-11-18 | End: 2024-11-18 | Stop reason: HOSPADM

## 2024-11-18 RX ORDER — HYDROMORPHONE HYDROCHLORIDE 1 MG/ML
0.1 INJECTION, SOLUTION INTRAMUSCULAR; INTRAVENOUS; SUBCUTANEOUS
Status: DISCONTINUED | OUTPATIENT
Start: 2024-11-18 | End: 2024-11-18 | Stop reason: HOSPADM

## 2024-11-18 RX ORDER — ONDANSETRON 2 MG/ML
INJECTION INTRAMUSCULAR; INTRAVENOUS PRN
Status: DISCONTINUED | OUTPATIENT
Start: 2024-11-18 | End: 2024-11-18 | Stop reason: SURG

## 2024-11-18 RX ORDER — MIDAZOLAM HYDROCHLORIDE 1 MG/ML
1 INJECTION INTRAMUSCULAR; INTRAVENOUS
Status: DISCONTINUED | OUTPATIENT
Start: 2024-11-18 | End: 2024-11-18 | Stop reason: HOSPADM

## 2024-11-18 RX ORDER — KETOROLAC TROMETHAMINE 15 MG/ML
INJECTION, SOLUTION INTRAMUSCULAR; INTRAVENOUS PRN
Status: DISCONTINUED | OUTPATIENT
Start: 2024-11-18 | End: 2024-11-18 | Stop reason: SURG

## 2024-11-18 RX ORDER — ROCURONIUM BROMIDE 10 MG/ML
INJECTION, SOLUTION INTRAVENOUS PRN
Status: DISCONTINUED | OUTPATIENT
Start: 2024-11-18 | End: 2024-11-18 | Stop reason: SURG

## 2024-11-18 RX ORDER — ONDANSETRON 2 MG/ML
4 INJECTION INTRAMUSCULAR; INTRAVENOUS
Status: DISCONTINUED | OUTPATIENT
Start: 2024-11-18 | End: 2024-11-18 | Stop reason: HOSPADM

## 2024-11-18 RX ORDER — CELECOXIB 200 MG/1
200 CAPSULE ORAL ONCE
Status: COMPLETED | OUTPATIENT
Start: 2024-11-18 | End: 2024-11-18

## 2024-11-18 RX ORDER — ALBUTEROL SULFATE 5 MG/ML
2.5 SOLUTION RESPIRATORY (INHALATION)
Status: DISCONTINUED | OUTPATIENT
Start: 2024-11-18 | End: 2024-11-18 | Stop reason: HOSPADM

## 2024-11-18 RX ORDER — BUPIVACAINE HYDROCHLORIDE AND EPINEPHRINE 2.5; 5 MG/ML; UG/ML
INJECTION, SOLUTION EPIDURAL; INFILTRATION; INTRACAUDAL; PERINEURAL
Status: DISCONTINUED | OUTPATIENT
Start: 2024-11-18 | End: 2024-11-18 | Stop reason: HOSPADM

## 2024-11-18 RX ORDER — HYDRALAZINE HYDROCHLORIDE 20 MG/ML
5 INJECTION INTRAMUSCULAR; INTRAVENOUS
Status: DISCONTINUED | OUTPATIENT
Start: 2024-11-18 | End: 2024-11-18 | Stop reason: HOSPADM

## 2024-11-18 RX ORDER — SODIUM CHLORIDE 9 MG/ML
INJECTION, SOLUTION INTRAVENOUS
Status: DISCONTINUED | OUTPATIENT
Start: 2024-11-18 | End: 2024-11-18 | Stop reason: SURG

## 2024-11-18 RX ORDER — BUPIVACAINE HYDROCHLORIDE AND EPINEPHRINE 2.5; 5 MG/ML; UG/ML
INJECTION, SOLUTION EPIDURAL; INFILTRATION; INTRACAUDAL; PERINEURAL
Status: DISCONTINUED
Start: 2024-11-18 | End: 2024-11-18 | Stop reason: HOSPADM

## 2024-11-18 RX ADMIN — SCOPOLAMINE 1 PATCH: 1.5 PATCH, EXTENDED RELEASE TRANSDERMAL at 12:51

## 2024-11-18 RX ADMIN — FENTANYL CITRATE 100 MCG: 50 INJECTION, SOLUTION INTRAMUSCULAR; INTRAVENOUS at 14:23

## 2024-11-18 RX ADMIN — SODIUM CHLORIDE: 9 INJECTION, SOLUTION INTRAVENOUS at 14:20

## 2024-11-18 RX ADMIN — FENTANYL CITRATE 50 MCG: 50 INJECTION, SOLUTION INTRAMUSCULAR; INTRAVENOUS at 15:40

## 2024-11-18 RX ADMIN — OXYCODONE HYDROCHLORIDE 10 MG: 5 SOLUTION ORAL at 15:39

## 2024-11-18 RX ADMIN — DEXAMETHASONE SODIUM PHOSPHATE 8 MG: 4 INJECTION INTRA-ARTICULAR; INTRALESIONAL; INTRAMUSCULAR; INTRAVENOUS; SOFT TISSUE at 14:30

## 2024-11-18 RX ADMIN — ROCURONIUM BROMIDE 50 MG: 50 INJECTION, SOLUTION INTRAVENOUS at 14:23

## 2024-11-18 RX ADMIN — MIDAZOLAM HYDROCHLORIDE 2 MG: 1 INJECTION, SOLUTION INTRAMUSCULAR; INTRAVENOUS at 14:20

## 2024-11-18 RX ADMIN — KETOROLAC TROMETHAMINE 15 MG: 15 INJECTION, SOLUTION INTRAMUSCULAR; INTRAVENOUS at 15:04

## 2024-11-18 RX ADMIN — ONDANSETRON 4 MG: 2 INJECTION INTRAMUSCULAR; INTRAVENOUS at 15:04

## 2024-11-18 RX ADMIN — LIDOCAINE HYDROCHLORIDE 100 MG: 20 INJECTION, SOLUTION EPIDURAL; INFILTRATION; INTRACAUDAL; PERINEURAL at 14:23

## 2024-11-18 RX ADMIN — PROPOFOL 200 MG: 10 INJECTION, EMULSION INTRAVENOUS at 14:23

## 2024-11-18 RX ADMIN — SUGAMMADEX 200 MG: 100 INJECTION, SOLUTION INTRAVENOUS at 15:04

## 2024-11-18 RX ADMIN — ACETAMINOPHEN 1000 MG: 500 TABLET ORAL at 12:51

## 2024-11-18 RX ADMIN — CELECOXIB 200 MG: 200 CAPSULE ORAL at 12:51

## 2024-11-18 RX ADMIN — FENTANYL CITRATE 50 MCG: 50 INJECTION, SOLUTION INTRAMUSCULAR; INTRAVENOUS at 15:51

## 2024-11-18 ASSESSMENT — FIBROSIS 4 INDEX: FIB4 SCORE: 0.58

## 2024-11-18 ASSESSMENT — PAIN SCALES - GENERAL: PAIN_LEVEL: 5

## 2024-11-18 NOTE — DISCHARGE INSTRUCTIONS
What to Expect Post Anesthesia    Rest and take it easy for the first 24 hours.  A responsible adult is recommended to remain with you during that time.  It is normal to feel sleepy.  We encourage you to not do anything that requires balance, judgment or coordination.    FOR 24 HOURS DO NOT:  Drive, operate machinery or run household appliances.  Drink beer or alcoholic beverages.  Make important decisions or sign legal documents.    To avoid nausea, slowly advance diet as tolerated, avoiding spicy or greasy foods for the first day.  Add more substantial food to your diet according to your provider's instructions.  Babies can be fed formula or breast milk as soon as they are hungry.  INCREASE FLUIDS AND FIBER TO AVOID CONSTIPATION.    MILD FLU-LIKE SYMPTOMS ARE NORMAL.  YOU MAY EXPERIENCE GENERALIZED MUSCLE ACHES, THROAT IRRITATION, HEADACHE AND/OR SOME NAUSEA.    If any questions arise, call your provider.  If your provider is not available, please feel free to call the Surgical Center at [phone].    MEDICATIONS: Resume taking daily medication.  Take prescribed pain medication with food.  If no medication is prescribed, you may take non-aspirin pain medication if needed.  PAIN MEDICATION CAN BE VERY CONSTIPATING.  Take a stool softener or laxative such as senokot, pericolace, or milk of magnesia if needed.    Last pain medication given at 3:40 p.m.       Pelvic Laparoscopy Discharge instructions    ACTIVITIES:  DO NOT USE tampons, douche, or have sexual intercourse until cleared at your follow-up appointment.    After discharge from the hospital, rest today, then you may resume full routine activities. However, there should be no heavy lifting (greater than 10 pounds), avoid straining, and no strenuous activities until after your follow-up visit. Otherwise, routine activities of daily living are acceptable.    DRIVING:   You may drive whenever you are off pain medications and are able to perform the  activities needed to drive, i.e. turning, bending, twisting, etc.    BATHING:   You may shower starting tomorrow pat incisions dry with a clean towel do not rub. No tub baths, hot tubs, or swimming until your follow up appointment.     WOUND CARE:  You will have one or more small incisions. If you have wound dressings, they may come off after 48 hours. If you have skin glue to the wound, this will fall off on its own, do not pick at it. If you have steri strips to the wound, these will fall off on their own, do not pick at them, may trim the edges if needed.     You may experience discomfort in the shoulder area, mild breathing difficulty, aching in the upper back and bloating for 2 to 3 days after this procedure due to the air inflated into the abdomen during your surgery.     Expect some vaginal bleeding, however, if you pass clots or saturate a celso pad more often than once an hour or are not comfortable with the amount of blood you notice please notify your provider.      BOWEL FUNCTION:  Constipation is common after surgery. The combination of pain medication and decreased activity level can cause constipation in otherwise normal patients. If you feel this is occurring, take a laxative (Milk of Magnesia, Ex-Lax, Senokot, etc.) until the problem has been resolved. It also helps to stay regular by including fiber in your diet (for example bran or fruits and vegetables) and drink plenty of liquids (water, juice, etc.).

## 2024-11-18 NOTE — OP REPORT
Operative Note    Patient: Toña Turner  MRN: [id number]   YOB: 1990   Age: 34 y.o.   Sex:female  Unit: [facility] PACU [facility]  Room/Bed: [facility]/NONE  Location: [facility]      Date of Procedure: 11/18/2024     Preoperative Diagnosis: PERMANENT STERILIZATION  Postoperative Diagnosis: PERMANENT STERILIZATION   Procedure: Procedure(s):  LAPAROSCOPIC BILATERAL SALPINGECTOMY  Surgeon: Surgeons and Role:     * Max Pineda M.D. - Primary     * Ryanne Wang M.D. - Assisting  Anesthesia: General   Antibiotics: none  Estimated Blood Loss: Minimal    Specimens: bilateral fallopian tubes    Findings: Normal appearing uterus, tubes and ovaries bilaterally.      Complications: None      INDICATIONS:   34 y.o. presents for operative laparoscopy for permanent sterilization.  The risks, benefits and alternatives of laparoscopy were discussed with the patient, including but not limited to infection, disfiguring scar, severe loss of blood, venous thrombosis, injury to bowel, bladder or ureter, injury to blood vessels, and rare chance of needing to convert to laparotomy to complete the surgery or any repair.  She was counseled on the permanent/irreversible nature of removal of fallopian tubes for sterilization.       PROCEDURE:   The patient was taken to the operating room where general anesthesia was undertaken and found to be adequate. She was then prepped and draped in the dorsal lithotomy position.  A time out was taken.  Moore catheter was placed. Sponge stick was placed in the vaginal vault.     Gloves were changed then attention was then turned to the abdomen.  The celso umbilical region was anesthetized with 0.25% marcaine plain.  A 5-mm skin incision was then made in the umbilicus. Two towel clamps were applied to the lateral edges of the umbilicus and the abdominal wall was tented upwards. A Veress needle was then placed into the abdomen. The water drop test was used to confirm  intraperitoneal placement, and the Veress needle was then hooked up to CO2 gas. Opening pressure was noted at 4 mmHg, and the abdomen was insufflated without difficulty.  Veress needle was removed and 5-mm trocar was placed with direct visualization into the peritoneum cavity without difficulty.  Two lateral 5mm ports were placed on left and right in the same manner under direct visualization.  At this time a thorough investigation of the pelvis was performed. The investigation revealed the findings listed above.    The first tube was grasped with a blunt grasper and elevated and then the Harmonic scalpel was used to ligate the tubo-ovarian ligament.  Dissection was carried along the meosalpinx to the uterine cornua.  The fallopian tube was then transected and the specimen detached. Procedure was repeated on the contralateral side. The right uteroovarian ligament pedicle had oozing so Ligasure device was used to cauterize this area and just adjacent to the round ligament. Irrigation of pelvis was carried out. Good hemostasis was observed. Right and left fallopian tubes were sent as pathology specimen.     Instruments were removed from the abdomen and the abdomen was relieved of all CO2 gas. The trocars were removed from the abdomen. The skin incisions were then closed with a 4-0 Vicryl stitch. The skin incisions were sealed with Dermabond. The uterine manipulator was removed from vaginal vault without difficulty. Hemostasis was noted.  The patient was extubated and  taken to the PACU in stable condition. Total blood loss was < 5 ml.     Sponge, lap and needle counts were correct.    Patient tolerated the procedure well.     Max Pineda M.D.    Obstetrics and Gynecology    11/18/2024 3:26 PM

## 2024-11-18 NOTE — OR NURSING
1520: Pt arrived from OR. Report received. Connected to monitor, VSS. Pt on 6L oxymask. X3 lap sites to abd, CDI. No bleeding noted to celso pad.     1535: pt on room air. Pt tolerating liquids    1540: pt complaining of 9/10 pain. Medicated per MAR  Dr. Pineda at bedside.     1550: pt still rates pain 9/10. Medicated per MAR. Tolerating solid foods.    1615: pt's friend, Valarie, called and updated about pt's condition.     1624: handoff given to Shellie PINEDO

## 2024-11-18 NOTE — ANESTHESIA PROCEDURE NOTES
Airway    Date/Time: 11/18/2024 2:24 PM    Performed by: Marielle Saldana M.D.  Authorized by: Marielle Saldana M.D.    Location:  OR  Urgency:  Elective  Indications for Airway Management:  Anesthesia      Spontaneous Ventilation: absent    Sedation Level:  Deep  Preoxygenated: Yes    Patient Position:  Sniffing  Mask Difficulty Assessment:  1 - vent by mask  Final Airway Type:  Endotracheal airway  Final Endotracheal Airway:  ETT  Cuffed: Yes    Technique Used for Successful ETT Placement:  Direct laryngoscopy  Devices/Methods Used in Placement:  Intubating stylet    Insertion Site:  Oral  Blade Type:  Pranav  Laryngoscope Blade/Videolaryngoscope Blade Size:  3  ETT Size (mm):  7.0  Measured from:  Teeth  ETT to Teeth (cm):  22  Placement Verified by: auscultation and capnometry    Cormack-Lehane Classification:  Grade IIb - view of arytenoids or posterior of glottis only  Number of Attempts at Approach:  1

## 2024-11-18 NOTE — ANESTHESIA PREPROCEDURE EVALUATION
Case: [id number] Date/Time: 11/18/24 1245    Procedure: LAPAROSCOPIC BILATERAL SALPINGECTOMY    Pre-op diagnosis: PERMANENT STERILIZATION    Location: [facility] ROOM 27 / SURGERY SAME DAY [facility]    Surgeons: Max Pineda M.D.            Relevant Problems   No relevant active problems       Physical Exam    Airway   Mallampati: I  TM distance: >3 FB  Neck ROM: full       Cardiovascular - normal exam     Dental - normal exam           Pulmonary   Breath sounds clear to auscultation     Abdominal   (+) obese     Neurological - normal exam                   Anesthesia Plan    ASA 2       Plan - general       Airway plan will be ETT          Induction: intravenous    Postoperative Plan: Postoperative administration of opioids is intended.    Pertinent diagnostic labs and testing reviewed    Informed Consent:    Anesthetic plan and risks discussed with patient.

## 2024-11-18 NOTE — OR SURGEON
Immediate Operative Note    PreOp Diagnosis: 35 yo  desiring surgical sterilization.       PostOp Diagnosis: Same as above      Procedure(s):  LAPAROSCOPIC BILATERAL SALPINGECTOMY - Wound Class: Clean    Surgeon(s):  NILDA Menchaca M.D.    Anesthesiologist/Type of Anesthesia:  Anesthesiologist: Marielle Saldana M.D./General    Surgical Staff:  Circulator: PILAR Larsen Circulator: PILAR Scott Scrub: Jayda Rivera  Scrub Person: Odalis Kendall    Specimens removed if any:  ID Type Source Tests Collected by Time Destination   A : LEFT FALLOPIAN TUBE Tissue Fallopian Tube PATHOLOGY SPECIMEN Max Pineda M.D. 2024  2:57 PM    B : RIGHT FALLOPIAN TUBE Tissue Fallopian Tube PATHOLOGY SPECIMEN Max Pineda M.D. 2024  2:58 PM        Estimated Blood Loss: < 5 ml    Findings: Normal appearing uterus, tubes and ovaries bilaterally.     Complications: None        2024 3:21 PM Max Pineda M.D.

## 2024-11-19 NOTE — OR NURSING
1625 handoff from Lesly PINEDO.  Pt in [facility] resting comfortably. VSS. Friend Valarie at bedside.      1634 pt up to bathroom to void. Able to void. Dressed and up to recliner.       1648 pt states readiness to go home. Meets discharge criteria. IV removed. D/c instructions discussed and all questions answered. Pt d/ c to home with family. Escorted out by staff, all belongings sent with pt

## 2024-11-19 NOTE — ANESTHESIA POSTPROCEDURE EVALUATION
Patient: Toña Turner    Procedure Summary       Date: 11/18/24 Room / Location: [facility] ROOM 27 / SURGERY SAME DAY [facility]    Anesthesia Start: 1420 Anesthesia Stop: 1521    Procedure: LAPAROSCOPIC BILATERAL SALPINGECTOMY (Bilateral: Abdomen) Diagnosis: (PERMANENT STERILIZATION)    Surgeons: Max Pineda M.D. Responsible Provider: Marielle Saldana M.D.    Anesthesia Type: general ASA Status: 2            Final Anesthesia Type: general  Last vitals  BP   Blood Pressure: 112/63    Temp   36.3 °C (97.4 °F)    Pulse   60   Resp   18    SpO2   99 %      Anesthesia Post Evaluation    Patient location during evaluation: PACU  Patient participation: complete - patient participated  Level of consciousness: awake and alert  Pain score: 5    Airway patency: patent  Anesthetic complications: no  Cardiovascular status: hemodynamically stable  Respiratory status: acceptable  Hydration status: euvolemic    PONV: none          No notable events documented.     Nurse Pain Score: 5 (NPRS)

## 2024-11-19 NOTE — ANESTHESIA TIME REPORT
Anesthesia Start and Stop Event Times       Date Time Event    11/18/2024 1414 Ready for Procedure     1420 Anesthesia Start     1521 Anesthesia Stop          Responsible Staff  11/18/24      Name Role Begin End    Marielle Saldana M.D. Anesth 1420 1521          Overtime Reason:  no overtime (within assigned shift)    Comments:

## 2024-11-21 ENCOUNTER — TELEPHONE (OUTPATIENT)
Dept: OBGYN | Facility: CLINIC | Age: 34
End: 2024-11-21
Payer: MEDICAID

## 2024-11-21 DIAGNOSIS — G89.18 ACUTE POSTOPERATIVE PAIN: ICD-10-CM

## 2024-11-21 RX ORDER — OXYCODONE HYDROCHLORIDE 5 MG/1
5 TABLET ORAL EVERY 4 HOURS PRN
Qty: 30 TABLET | Refills: 0 | Status: SHIPPED | OUTPATIENT
Start: 2024-11-21 | End: 2024-11-26

## 2024-11-21 NOTE — TELEPHONE ENCOUNTER
Caller Name: Toña Turner    Call Back Number: [phone]      How would the patient prefer to be contacted with a response: Phone call OK to leave a detailed message  Pt called stating she is having a problem with her medication and will want to have a new doctor since [facility] told her that Dr. Colindres licence number is not in there system send her the medication, I told pt ill call [facility] and see what is going on since medication is OXY   Called pharmacy and they are on meal break so ill call them once they are back  Called pt to let her know what is going on and she was understanding

## 2024-11-22 LAB — COMPONENT CELLULAR 8504CLL: NORMAL

## 2024-12-03 ENCOUNTER — GYNECOLOGY VISIT (OUTPATIENT)
Dept: OBGYN | Facility: CLINIC | Age: 34
End: 2024-12-03
Payer: MEDICAID

## 2024-12-03 VITALS
BODY MASS INDEX: 31.25 KG/M2 | DIASTOLIC BLOOD PRESSURE: 62 MMHG | SYSTOLIC BLOOD PRESSURE: 108 MMHG | HEIGHT: 62 IN | WEIGHT: 169.8 LBS

## 2024-12-03 DIAGNOSIS — Z48.89 ENCOUNTER FOR POSTOPERATIVE CARE: ICD-10-CM

## 2024-12-03 PROCEDURE — 99024 POSTOP FOLLOW-UP VISIT: CPT | Performed by: OBSTETRICS & GYNECOLOGY

## 2024-12-03 PROCEDURE — 3078F DIAST BP <80 MM HG: CPT | Performed by: OBSTETRICS & GYNECOLOGY

## 2024-12-03 PROCEDURE — 3074F SYST BP LT 130 MM HG: CPT | Performed by: OBSTETRICS & GYNECOLOGY

## 2024-12-03 ASSESSMENT — FIBROSIS 4 INDEX: FIB4 SCORE: 0.58

## 2024-12-03 NOTE — PROGRESS NOTES
Post OP Visit  Surgery date: 11/18/2024  Procedure: Laparoscopic Bilateral Salpingectomy  [facility] # [phone]

## 2024-12-03 NOTE — PROGRESS NOTES
"Postoperative Follow Up Visit    Toña Turner is a 34 y.o. female    Chief complaint    No chief complaint on file.      S/p L/S bilateral salpingectomy on 11/18/24 for sterilization presenting for postop check    COMPLAINTS:    Reports feeling 'fabulous'. Denies fevers/chills. No N/V, constipation/diarrhea, pain or heavy vaginal bleeding. No drainage or leakage from incisions.       OBJECTIVE:    /62 (BP Location: Right arm, Patient Position: Sitting, BP Cuff Size: Large adult)   Ht 5' 2\"   Wt 169 lb 12.8 oz   LMP 11/07/2024 Comment: LMP: No periods yet since after surgery  BMI 31.06 kg/m²     General: No acute distress, well nourished, alert.     ABDOMEN: Soft nondistended, nontender, no peritoneal signs, no masses.     INCISION: Clean, dry, intact, no drainage, erythema or separation.    Pathology    11/18/24  SURGICAL PATHOLOGY CONSULTATION       FINAL DIAGNOSIS:     A. Left fallopian tube:          Complete cross section of fimbriated fallopian tube identified.   B. Right fallopian tube:          Complete cross section of fimbriated fallopian tube identified.     A/P    1. Encounter for postoperative care        S/p L/S bilateral salpingectomy on 11/18/24 for sterilization,  doing well.     Postop precautions and limitations for another 4 weeks.     Follow up in 4 weeks for final postop check.     Max Pineda M.D.    Obstetrics and Gynecology    12/3/822067:25 AM  "

## 2024-12-30 ENCOUNTER — GYNECOLOGY VISIT (OUTPATIENT)
Dept: OBGYN | Facility: CLINIC | Age: 34
End: 2024-12-30
Payer: MEDICAID

## 2024-12-30 VITALS
WEIGHT: 175 LBS | SYSTOLIC BLOOD PRESSURE: 122 MMHG | HEIGHT: 62 IN | BODY MASS INDEX: 32.2 KG/M2 | DIASTOLIC BLOOD PRESSURE: 62 MMHG

## 2024-12-30 DIAGNOSIS — Z48.89 ENCOUNTER FOR POSTOPERATIVE CARE: ICD-10-CM

## 2024-12-30 ASSESSMENT — FIBROSIS 4 INDEX: FIB4 SCORE: 0.58

## 2024-12-30 NOTE — PROGRESS NOTES
"Postoperative Follow Up Visit    Toña Turner is a 34 y.o. female    Chief complaint    No chief complaint on file.      S/p L/S bilateral salpingectomy on 11/18/24 for sterilization presenting for postop check     COMPLAINTS:    Doing well. Denies fevers/chills. No N/V. No pain. No constipation/diarrhea. No problems voiding. Had normal menses this month.       OBJECTIVE:    /62 (BP Location: Right arm, Patient Position: Sitting, BP Cuff Size: Large adult)   Ht 5' 2\"   Wt 175 lb   LMP 12/17/2024 (Exact Date)   BMI 32.01 kg/m²     General: No acute distress, well nourished, alert.     ABDOMEN: Soft nondistended, nontender, no peritoneal signs, no masses.     INCISION: Clean, dry, intact, no drainage, erythema or separation.    A/P    1. Encounter for postoperative care        S/p L/S bilateral salpingectomy on 11/18/24 for sterilization presenting for postop check , doing well.     May return to normal activities/exercise.     RTC in 1 year for annual or PRN.     Max Pineda M.D.    Obstetrics and Gynecology    12/30/20248:49 AM  "

## 2025-03-27 ENCOUNTER — APPOINTMENT (OUTPATIENT)
Dept: URGENT CARE | Facility: PHYSICIAN GROUP | Age: 35
End: 2025-03-27
Payer: MEDICAID

## 2025-06-14 ENCOUNTER — HOSPITAL ENCOUNTER (OUTPATIENT)
Facility: MEDICAL CENTER | Age: 35
End: 2025-06-14
Payer: MEDICAID

## 2025-06-14 ENCOUNTER — OFFICE VISIT (OUTPATIENT)
Dept: URGENT CARE | Facility: CLINIC | Age: 35
End: 2025-06-14
Payer: MEDICAID

## 2025-06-14 VITALS
BODY MASS INDEX: 30.55 KG/M2 | OXYGEN SATURATION: 95 % | DIASTOLIC BLOOD PRESSURE: 70 MMHG | SYSTOLIC BLOOD PRESSURE: 122 MMHG | TEMPERATURE: 97.9 F | WEIGHT: 166 LBS | RESPIRATION RATE: 16 BRPM | HEIGHT: 62 IN | HEART RATE: 61 BPM

## 2025-06-14 DIAGNOSIS — N76.0 ACUTE VAGINITIS: ICD-10-CM

## 2025-06-14 DIAGNOSIS — N76.0 ACUTE VAGINITIS: Primary | ICD-10-CM

## 2025-06-14 LAB
APPEARANCE UR: CLEAR
BILIRUB UR STRIP-MCNC: NEGATIVE MG/DL
CANDIDA DNA VAG QL PROBE+SIG AMP: NEGATIVE
COLOR UR AUTO: NORMAL
G VAGINALIS DNA VAG QL PROBE+SIG AMP: NEGATIVE
GLUCOSE UR STRIP.AUTO-MCNC: NEGATIVE MG/DL
KETONES UR STRIP.AUTO-MCNC: NEGATIVE MG/DL
LEUKOCYTE ESTERASE UR QL STRIP.AUTO: NEGATIVE
NITRITE UR QL STRIP.AUTO: NEGATIVE
PH UR STRIP.AUTO: 6 [PH] (ref 5–8)
PROT UR QL STRIP: NEGATIVE MG/DL
RBC UR QL AUTO: NEGATIVE
SP GR UR STRIP.AUTO: 1.02
T VAGINALIS DNA VAG QL PROBE+SIG AMP: NEGATIVE
UROBILINOGEN UR STRIP-MCNC: 1 MG/DL

## 2025-06-14 PROCEDURE — 87480 CANDIDA DNA DIR PROBE: CPT

## 2025-06-14 PROCEDURE — 99214 OFFICE O/P EST MOD 30 MIN: CPT

## 2025-06-14 PROCEDURE — 87660 TRICHOMONAS VAGIN DIR PROBE: CPT

## 2025-06-14 PROCEDURE — 3078F DIAST BP <80 MM HG: CPT

## 2025-06-14 PROCEDURE — 87510 GARDNER VAG DNA DIR PROBE: CPT

## 2025-06-14 PROCEDURE — 81002 URINALYSIS NONAUTO W/O SCOPE: CPT

## 2025-06-14 PROCEDURE — 3074F SYST BP LT 130 MM HG: CPT

## 2025-06-14 ASSESSMENT — FIBROSIS 4 INDEX: FIB4 SCORE: 0.6

## 2025-06-14 ASSESSMENT — ENCOUNTER SYMPTOMS: FEVER: 0

## 2025-06-14 NOTE — PROGRESS NOTES
Subjective:     CHIEF COMPLAINT  Chief Complaint   Patient presents with    Other     Vaginally dryness & itching raw on the inside of my vagina lips. Red & warm to   touch     X 2 week       HPI  Toña Turner is a very pleasant 35 y.o. female who presents accompanied by her  with 1.5 weeks of vaginal itching and redness/irritation to her labia minora.  She initially thought her symptoms may be due to shaving and chafing due to the hair returning.  She reports that the external portion of her labia majora looks at baseline, but her  noticed that her labia minora looked red and warm.  She denies any changes in discharge.  She denies any rashes or bumps.  She is otherwise feeling well.      REVIEW OF SYSTEMS  Review of Systems   Constitutional:  Negative for fever.   Genitourinary:  Negative for dysuria, frequency and urgency.   Skin:  Positive for itching (Vaginal itching). Negative for rash.       PAST MEDICAL HISTORY  Patient Active Problem List    Diagnosis Date Noted    Request for sterilization 2024    Grand multipara 2024    Marijuana use 2024    History of seizures 2024    History of postpartum hemorrhage 2024       SURGICAL HISTORY   has a past surgical history that includes other (); other abdominal surgery (); and lap,diagnostic abdomen (Bilateral, 2024).    ALLERGIES  Allergies[1]    CURRENT MEDICATIONS  Home Medications       Reviewed by Sera Street P.A.-C. (Physician Assistant) on 25 at 1219  Med List Status: <None>     Medication Last Dose Status        Patient Kuldeep Taking any Medications                           SOCIAL HISTORY  Social History     Tobacco Use    Smoking status: Former     Current packs/day: 0.00     Types: Cigarettes     Quit date: 2012     Years since quittin.1    Smokeless tobacco: Never   Vaping Use    Vaping status: Never Used   Substance and Sexual Activity    Alcohol use: Not Currently      "Comment: Rare Occ    Drug use: Yes     Types: Marijuana     Comment: marijuana edibles    Sexual activity: Yes     Partners: Male     Birth control/protection: None, Condom, Pill       FAMILY HISTORY  Family History   Problem Relation Age of Onset    Cancer Mother         Lieukemia    Diabetes Mother     No Known Problems Father           Objective:     VITAL SIGNS: /70   Pulse 61   Temp 36.6 °C (97.9 °F) (Temporal)   Resp 16   Ht 1.575 m (5' 2\")   Wt 75.3 kg (166 lb)   SpO2 95%   BMI 30.36 kg/m²     PHYSICAL EXAM  Physical Exam  Vitals reviewed.   Constitutional:       General: She is not in acute distress.     Appearance: Normal appearance. She is not ill-appearing or toxic-appearing.   HENT:      Head: Normocephalic and atraumatic.      Mouth/Throat:      Mouth: Mucous membranes are moist.   Eyes:      Conjunctiva/sclera: Conjunctivae normal.      Pupils: Pupils are equal, round, and reactive to light.   Cardiovascular:      Rate and Rhythm: Normal rate.   Pulmonary:      Effort: Pulmonary effort is normal. No respiratory distress.   Genitourinary:     Comments: Deferred by patient   Skin:     General: Skin is warm and dry.   Neurological:      General: No focal deficit present.      Mental Status: She is alert and oriented to person, place, and time.   Psychiatric:         Mood and Affect: Mood normal.         Assessment/Plan:     1. Acute vaginitis  - POCT Urinalysis  - VAGINAL PATHOGENS DNA PANEL; Future  -Monistat OTC may be initiated for suspected vaginal candidiasis   - Return to clinic if symptoms worsen or fail to resolve    MDM/Comments:  Patient has stable vital signs and is non-toxic appearing.  Urinalysis obtained in office with normal findings.  A vaginal pathogen swab has been obtained with results pending.  Patient has been experiencing vaginal and labial irritation, itching, and erythema.  Given reported symptoms, I do suspect symptoms may be secondary to vaginal candidiasis.  " Discussed with patient that she may initiate Monistat if unable to wait for results via the vaginal pathogen swab.  Patient has politely declined a physical exam of the vagina and labia. Patient demonstrated understanding of treatment plan at this time and will RTC if symptoms worsen or fail to resolve.     Differential diagnosis, natural history, supportive care, and indications for immediate follow-up discussed. All questions answered. Patient agrees with the plan of care.    Follow-up as needed if symptoms worsen or fail to improve to PCP, Urgent care or Emergency Room.    I have personally reviewed prior external notes and test results pertinent to today's visit.  I have independently reviewed and interpreted all diagnostics ordered during this urgent care acute visit.   Discussed management options (risks,benefits, and alternatives to treatment). Pt expresses understanding and the treatment plan was agreed upon. Questions were encouraged and answered to pt's satisfaction.    Please note that this dictation was created using voice recognition software. I have made a reasonable attempt to correct obvious errors, but I expect that there are errors of grammar and possibly content that I did not discover before finalizing the note.       [1]   Allergies  Allergen Reactions    Amoxicillin Rash     Hives      Codeine Hives    Hydrocodone Rash     Rash to arms and chest    Latex Hives    Pcn [Penicillins] Rash

## 2025-06-16 ENCOUNTER — RESULTS FOLLOW-UP (OUTPATIENT)
Dept: URGENT CARE | Facility: PHYSICIAN GROUP | Age: 35
End: 2025-06-16

## (undated) DEVICE — SUCTION INSTRUMENT YANKAUER BULBOUS TIP W/O VENT (50EA/CA)

## (undated) DEVICE — TUBE CONNECTING SUCTION - CLEAR PLASTIC STERILE 72 IN (50EA/CA)

## (undated) DEVICE — SENSOR OXIMETER ADULT SPO2 RD SET (20EA/BX)

## (undated) DEVICE — TROCAR 5X100 NON BLADED Z-TH - READ KII (6/BX)

## (undated) DEVICE — TOWEL STOP TIMEOUT SAFETY FLAG (40EA/CA)

## (undated) DEVICE — GLOVE BIOGEL PI INDICATOR SZ 7.5 SURGICAL PF LF -(50/BX 4BX/CA)

## (undated) DEVICE — CANNULA W/SEAL 5X100 Z-THRE - ADED KII (12/BX)

## (undated) DEVICE — NEEDLE INSFL 120MM 14GA VRRS - (20/BX)

## (undated) DEVICE — SET SUCTION/IRRIGATION WITH DISPOSABLE TIP (6/CA )PART #0250-070-520 IS A SUB

## (undated) DEVICE — TUBE E-T HI-LO CUFF 7.0MM (10EA/PK)

## (undated) DEVICE — PAD SANITARY 11IN MAXI IND WRAPPED (12EA/PK 24PK/CA)

## (undated) DEVICE — SLEEVE VASO DVT COMPRESSION CALF MED - (10PR/CA)

## (undated) DEVICE — TUBING CLEARLINK DUO-VENT - C-FLO (48EA/CA)

## (undated) DEVICE — SET LEADWIRE 5 LEAD BEDSIDE DISPOSABLE ECG (1SET OF 5/EA)

## (undated) DEVICE — WATER IRRIGATION STERILE 1000ML (12EA/CA)

## (undated) DEVICE — DRAPESURG STERI-DRAPE LONG - (10/BX 4BX/CA)

## (undated) DEVICE — GLOVE SZ 7 BIOGEL PI MICRO - PF LF (50PR/BX 4BX/CA)

## (undated) DEVICE — CANNULA O2 COMFORT SOFT EAR ADULT 7 FT TUBING (50/CA)

## (undated) DEVICE — SODIUM CHL IRRIGATION 0.9% 1000ML (12EA/CA)

## (undated) DEVICE — GLOVE BIOGEL INDICATOR SZ 6.5 SURGICAL PF LTX - (50PR/BX 4BX/CA)

## (undated) DEVICE — TRAY FOLEY CATHETER STATLOCK 16FR SURESTEP (10EA/CA)

## (undated) DEVICE — ELECTRODE DUAL RETURN W/ CORD - (50/PK)

## (undated) DEVICE — SYSTEM CLEARIFY VISUALIZATION (10EA/PK)

## (undated) DEVICE — DERMABOND ADVANCED - (12EA/BX)

## (undated) DEVICE — LACTATED RINGERS INJ 1000 ML - (14EA/CA 60CA/PF)

## (undated) DEVICE — Device

## (undated) DEVICE — KIT  I.V. START (100EA/CA)

## (undated) DEVICE — GLOVE BIOGEL SZ 6.5 SURGICAL PF LTX (50PR/BX 4BX/CA)

## (undated) DEVICE — SET TUBING PNEUMOCLEAR HIGH FLOW SMOKE EVACUATION (10EA/BX)

## (undated) DEVICE — CANISTER SUCTION RIGID RED 1500CC (40EA/CA)

## (undated) DEVICE — CANISTER SUCTION 3000ML MECHANICAL FILTER AUTO SHUTOFF MEDI-VAC NONSTERILE LF DISP (40EA/CA)

## (undated) DEVICE — PACK TRENGUARD 450 PROCEDURE (12EA/CA)

## (undated) DEVICE — GOWN WARMING STANDARD FLEX - (30/CA)

## (undated) DEVICE — MASK OXYGEN VNYL ADLT MED CONC WITH 7 FOOT TUBING - (50EA/CA)

## (undated) DEVICE — TROCAR 5X100 BLADED Z-THREAD - KII (6/BX)

## (undated) DEVICE — SUTURE GENERAL

## (undated) DEVICE — SUTURE 4-0 MONOCRYL PLUS PS-2 - 27 INCH (36/BX)